# Patient Record
Sex: FEMALE | Race: BLACK OR AFRICAN AMERICAN | NOT HISPANIC OR LATINO | Employment: FULL TIME | URBAN - METROPOLITAN AREA
[De-identification: names, ages, dates, MRNs, and addresses within clinical notes are randomized per-mention and may not be internally consistent; named-entity substitution may affect disease eponyms.]

---

## 2018-02-13 ENCOUNTER — HOSPITAL ENCOUNTER (OUTPATIENT)
Dept: FAMILY MEDICINE CLINIC | Facility: CLINIC | Age: 58
Setting detail: SPECIMEN
Discharge: HOME | End: 2018-02-13
Attending: FAMILY MEDICINE | Admitting: FAMILY MEDICINE

## 2018-02-13 LAB
ALBUMIN SERPL-MCNC: 3.7 G/DL (ref 3.5–4.8)
ALBUMIN/GLOB SERPL: 1.1 {RATIO} (ref 1–1.7)
ALP SERPL-CCNC: 52 IU/L (ref 32–91)
ALT SERPL-CCNC: 14 IU/L (ref 14–54)
ANION GAP SERPL CALC-SCNC: 11.2 MMOL/L (ref 10–20)
AST SERPL-CCNC: 17 IU/L (ref 15–41)
BILIRUB SERPL-MCNC: 0.7 MG/DL (ref 0.3–1.2)
BUN SERPL-MCNC: 11 MG/DL (ref 8–20)
BUN/CREAT SERPL: 11 (ref 5.4–26.2)
CALCIUM SERPL-MCNC: 9.3 MG/DL (ref 8.9–10.3)
CHLORIDE SERPL-SCNC: 104 MMOL/L (ref 101–111)
CHOLEST SERPL-MCNC: 208 MG/DL
CHOLEST/HDLC SERPL: 3.1 {RATIO}
CONV CO2: 27 MMOL/L (ref 22–32)
CONV LDL CHOLESTEROL DIRECT: 122 MG/DL (ref 0–100)
CONV TOTAL PROTEIN: 7.1 G/DL (ref 6.1–7.9)
CREAT UR-MCNC: 1 MG/DL (ref 0.4–1)
GLOBULIN UR ELPH-MCNC: 3.4 G/DL (ref 2.5–3.8)
GLUCOSE SERPL-MCNC: 101 MG/DL (ref 65–99)
HDLC SERPL-MCNC: 68 MG/DL
LDLC/HDLC SERPL: 1.8 {RATIO}
LIPID INTERPRETATION: ABNORMAL
POTASSIUM SERPL-SCNC: 4.2 MMOL/L (ref 3.6–5.1)
SODIUM SERPL-SCNC: 138 MMOL/L (ref 136–144)
TRIGL SERPL-MCNC: 131 MG/DL
VLDLC SERPL CALC-MCNC: 17.7 MG/DL

## 2018-03-05 ENCOUNTER — HOSPITAL ENCOUNTER (OUTPATIENT)
Dept: MAMMOGRAPHY | Facility: HOSPITAL | Age: 58
Discharge: HOME | End: 2018-03-05
Attending: FAMILY MEDICINE | Admitting: FAMILY MEDICINE

## 2018-03-28 ENCOUNTER — HOSPITAL ENCOUNTER (OUTPATIENT)
Dept: GENERAL RADIOLOGY | Facility: HOSPITAL | Age: 58
Discharge: HOME | End: 2018-03-28

## 2018-04-17 ENCOUNTER — HOSPITAL ENCOUNTER (OUTPATIENT)
Dept: PHYSICAL THERAPY | Facility: HOSPITAL | Age: 58
Setting detail: RECURRING SERIES
Discharge: HOME | End: 2018-05-24

## 2019-03-04 ENCOUNTER — HOSPITAL ENCOUNTER (OUTPATIENT)
Dept: MAMMOGRAPHY | Facility: HOSPITAL | Age: 59
Discharge: HOME | End: 2019-03-04
Attending: FAMILY MEDICINE | Admitting: FAMILY MEDICINE

## 2019-07-02 ENCOUNTER — LAB (OUTPATIENT)
Dept: FAMILY MEDICINE CLINIC | Facility: CLINIC | Age: 59
End: 2019-07-02

## 2019-07-02 ENCOUNTER — OFFICE VISIT (OUTPATIENT)
Dept: FAMILY MEDICINE CLINIC | Facility: CLINIC | Age: 59
End: 2019-07-02

## 2019-07-02 VITALS
SYSTOLIC BLOOD PRESSURE: 126 MMHG | DIASTOLIC BLOOD PRESSURE: 77 MMHG | WEIGHT: 188 LBS | BODY MASS INDEX: 28.49 KG/M2 | OXYGEN SATURATION: 98 % | HEART RATE: 76 BPM | HEIGHT: 68 IN

## 2019-07-02 DIAGNOSIS — I10 ESSENTIAL HYPERTENSION: ICD-10-CM

## 2019-07-02 DIAGNOSIS — N95.1 HOT FLASHES DUE TO MENOPAUSE: ICD-10-CM

## 2019-07-02 DIAGNOSIS — I10 ESSENTIAL HYPERTENSION: Primary | ICD-10-CM

## 2019-07-02 DIAGNOSIS — Z12.11 ENCOUNTER FOR SCREENING COLONOSCOPY: ICD-10-CM

## 2019-07-02 DIAGNOSIS — J30.9 ALLERGIC RHINITIS, UNSPECIFIED SEASONALITY, UNSPECIFIED TRIGGER: ICD-10-CM

## 2019-07-02 LAB
ANION GAP SERPL CALCULATED.3IONS-SCNC: 12.4 MMOL/L (ref 10–20)
BUN BLD-MCNC: 9 MG/DL (ref 8–20)
BUN/CREAT SERPL: 9 (ref 5.4–26.2)
CALCIUM SPEC-SCNC: 9.1 MG/DL (ref 8.9–10.3)
CHLORIDE SERPL-SCNC: 102 MMOL/L (ref 101–111)
CO2 SERPL-SCNC: 25 MMOL/L (ref 22–32)
CREAT BLD-MCNC: 1 MG/DL (ref 0.4–1)
GFR SERPL CREATININE-BSD FRML MDRD: 69 ML/MIN/1.73
GLUCOSE BLD-MCNC: 81 MG/DL (ref 65–99)
POTASSIUM BLD-SCNC: 3.4 MMOL/L (ref 3.6–5.1)
SODIUM BLD-SCNC: 136 MMOL/L (ref 136–144)

## 2019-07-02 PROCEDURE — 80048 BASIC METABOLIC PNL TOTAL CA: CPT | Performed by: FAMILY MEDICINE

## 2019-07-02 PROCEDURE — 36415 COLL VENOUS BLD VENIPUNCTURE: CPT | Performed by: FAMILY MEDICINE

## 2019-07-02 PROCEDURE — 99213 OFFICE O/P EST LOW 20 MIN: CPT | Performed by: FAMILY MEDICINE

## 2019-07-02 RX ORDER — LISINOPRIL AND HYDROCHLOROTHIAZIDE 20; 12.5 MG/1; MG/1
1 TABLET ORAL DAILY
Qty: 90 TABLET | Refills: 3 | Status: SHIPPED | OUTPATIENT
Start: 2019-07-02 | End: 2020-06-26

## 2019-07-02 RX ORDER — CETIRIZINE HYDROCHLORIDE 10 MG/1
1 TABLET ORAL EVERY 24 HOURS
COMMUNITY
Start: 2017-07-13 | End: 2019-07-02 | Stop reason: SDUPTHER

## 2019-07-02 RX ORDER — ESTRADIOL 2 MG/1
1 TABLET ORAL DAILY
COMMUNITY
Start: 2019-05-14 | End: 2019-07-02 | Stop reason: SDUPTHER

## 2019-07-02 RX ORDER — FLUTICASONE PROPIONATE 50 MCG
SPRAY, SUSPENSION (ML) NASAL AS NEEDED
COMMUNITY
Start: 2016-11-15 | End: 2021-04-16 | Stop reason: SDUPTHER

## 2019-07-02 RX ORDER — ESTRADIOL 2 MG/1
2 TABLET ORAL DAILY
Qty: 90 TABLET | Refills: 3 | Status: SHIPPED | OUTPATIENT
Start: 2019-07-02 | End: 2020-07-19

## 2019-07-02 RX ORDER — LISINOPRIL AND HYDROCHLOROTHIAZIDE 20; 12.5 MG/1; MG/1
1 TABLET ORAL DAILY
COMMUNITY
Start: 2017-08-14 | End: 2019-07-02 | Stop reason: SDUPTHER

## 2019-07-02 RX ORDER — CETIRIZINE HYDROCHLORIDE 10 MG/1
10 TABLET ORAL EVERY 24 HOURS
Qty: 90 TABLET | Refills: 3 | Status: SHIPPED | OUTPATIENT
Start: 2019-07-02 | End: 2020-06-26

## 2019-07-02 RX ORDER — TRIAMCINOLONE ACETONIDE 5 MG/G
CREAM TOPICAL AS NEEDED
Refills: 0 | COMMUNITY
Start: 2019-04-23 | End: 2022-08-18

## 2019-07-02 NOTE — PROGRESS NOTES
Vito Walls is a 59 y.o. female.     Here for follow up on bp  Has lost 4.5 pounds  Needs refills on her meds  Has had hemorrhoids removed in past  Has a new one  She is due colonoscopy         The following portions of the patient's history were reviewed and updated as appropriate: allergies, current medications, past family history, past medical history, past social history, past surgical history and problem list.  Past Medical History:   Diagnosis Date   • Fibroids    • Hypertension      Past Surgical History:   Procedure Laterality Date   • HYSTERECTOMY      With BSO      Family History   Problem Relation Age of Onset   • Breast cancer Mother    • Osteoporosis Mother    • Diabetes Father    • Hypertension Father      Social History     Socioeconomic History   • Marital status: Single     Spouse name: Not on file   • Number of children: Not on file   • Years of education: Not on file   • Highest education level: Not on file   Tobacco Use   • Smoking status: Former Smoker   Substance and Sexual Activity   • Alcohol use: Yes   • Drug use: No         Current Outpatient Medications:   •  cetirizine (zyrTEC) 10 MG tablet, Take 1 tablet by mouth Daily., Disp: 90 tablet, Rfl: 3  •  fluticasone (FLONASE) 50 MCG/ACT nasal spray, As Needed., Disp: , Rfl:   •  lisinopril-hydrochlorothiazide (PRINZIDE,ZESTORETIC) 20-12.5 MG per tablet, Take 1 tablet by mouth Daily., Disp: 90 tablet, Rfl: 3  •  estradiol (ESTRACE) 2 MG tablet, Take 1 tablet by mouth Daily., Disp: 90 tablet, Rfl: 3  •  triamcinolone (KENALOG) 0.5 % cream, As Needed., Disp: , Rfl: 0    Review of Systems   Constitutional: Negative for diaphoresis, fatigue, fever, unexpected weight gain and unexpected weight loss.   Respiratory: Negative for cough, chest tightness and shortness of breath.    Cardiovascular: Negative for chest pain, palpitations and leg swelling.   Gastrointestinal: Negative for nausea and vomiting.        Hemorrhoid - no pain or  "bleeding   Neurological: Negative for dizziness, syncope and headache.     /77 (BP Location: Right arm, Patient Position: Sitting, Cuff Size: Adult)   Pulse 76   Ht 172.7 cm (68\")   Wt 85.3 kg (188 lb)   SpO2 98%   BMI 28.59 kg/m²       Objective   Physical Exam   Constitutional: Vital signs are normal. She appears well-developed and well-nourished. No distress. She appears overweight.   HENT:   Head: Normocephalic and atraumatic.   Neck: Neck supple. No JVD present. No thyromegaly present.   Cardiovascular: Normal rate, regular rhythm, normal heart sounds and intact distal pulses. Exam reveals no gallop and no friction rub.   No murmur heard.  Pulmonary/Chest: Effort normal and breath sounds normal. No respiratory distress. She has no wheezes. She has no rales.   Musculoskeletal: She exhibits no edema.   Lymphadenopathy:     She has no cervical adenopathy.   Neurological: She is alert.   Skin: Skin is warm and dry.   Psychiatric: She has a normal mood and affect.   Nursing note and vitals reviewed.        Assessment/Plan   Problems Addressed this Visit        Cardiovascular and Mediastinum    Hypertension - Primary    Relevant Medications    lisinopril-hydrochlorothiazide (PRINZIDE,ZESTORETIC) 20-12.5 MG per tablet    Other Relevant Orders    Basic metabolic panel      Other Visit Diagnoses     Allergic rhinitis, unspecified seasonality, unspecified trigger        Relevant Medications    cetirizine (zyrTEC) 10 MG tablet    Hot flashes due to menopause        Relevant Medications    estradiol (ESTRACE) 2 MG tablet    Encounter for screening colonoscopy        Relevant Orders    Ambulatory Referral For Screening Colonoscopy                 "

## 2019-09-04 ENCOUNTER — OFFICE (OUTPATIENT)
Dept: URBAN - METROPOLITAN AREA CLINIC 64 | Facility: CLINIC | Age: 59
End: 2019-09-04
Payer: COMMERCIAL

## 2019-09-04 VITALS
WEIGHT: 197 LBS | SYSTOLIC BLOOD PRESSURE: 153 MMHG | HEART RATE: 63 BPM | DIASTOLIC BLOOD PRESSURE: 93 MMHG | HEIGHT: 69 IN

## 2019-09-04 DIAGNOSIS — K59.00 CONSTIPATION, UNSPECIFIED: ICD-10-CM

## 2019-09-04 DIAGNOSIS — K64.4 RESIDUAL HEMORRHOIDAL SKIN TAGS: ICD-10-CM

## 2019-09-04 DIAGNOSIS — Z12.11 ENCOUNTER FOR SCREENING FOR MALIGNANT NEOPLASM OF COLON: ICD-10-CM

## 2019-09-04 PROCEDURE — 99203 OFFICE O/P NEW LOW 30 MIN: CPT | Performed by: INTERNAL MEDICINE

## 2020-01-07 ENCOUNTER — OFFICE VISIT (OUTPATIENT)
Dept: FAMILY MEDICINE CLINIC | Facility: CLINIC | Age: 60
End: 2020-01-07

## 2020-01-07 VITALS
TEMPERATURE: 98.2 F | HEART RATE: 75 BPM | OXYGEN SATURATION: 99 % | DIASTOLIC BLOOD PRESSURE: 87 MMHG | WEIGHT: 191 LBS | BODY MASS INDEX: 29.04 KG/M2 | SYSTOLIC BLOOD PRESSURE: 146 MMHG

## 2020-01-07 DIAGNOSIS — J06.9 ACUTE URI: Primary | ICD-10-CM

## 2020-01-07 PROCEDURE — 99213 OFFICE O/P EST LOW 20 MIN: CPT | Performed by: NURSE PRACTITIONER

## 2020-01-07 RX ORDER — AMOXICILLIN 875 MG/1
875 TABLET, COATED ORAL 2 TIMES DAILY
Qty: 20 TABLET | Refills: 0 | Status: SHIPPED | OUTPATIENT
Start: 2020-01-07 | End: 2020-01-17

## 2020-01-07 NOTE — PROGRESS NOTES
Subjective   Yaa Walls is a 59 y.o. female.       HPI   Pt. Is here today with a possible URI.  Symptoms started several days ago.  She has had head congestion; ear pressure; sore throat; cough.  No fevers.    Has taken otc cold med with little relief.  Boyfriend has similar symptoms.      The following portions of the patient's history were reviewed and updated as appropriate: allergies, current medications, past family history, past medical history, past social history, past surgical history and problem list.    Review of Systems   Constitutional: Negative for activity change, appetite change, chills, diaphoresis, fatigue, fever, unexpected weight gain and unexpected weight loss.   HENT: Positive for congestion, ear pain, postnasal drip, sinus pressure, sneezing and sore throat. Negative for swollen glands and trouble swallowing.    Eyes: Negative for pain, discharge, redness and itching.   Respiratory: Positive for cough. Negative for shortness of breath and wheezing.    Cardiovascular: Negative for chest pain, palpitations and leg swelling.   Gastrointestinal: Negative for diarrhea, nausea and vomiting.   Neurological: Positive for dizziness. Negative for headache.   Hematological: Negative for adenopathy.   Psychiatric/Behavioral: Negative for negative for hyperactivity and depressed mood.       Objective   Physical Exam   Constitutional: She is oriented to person, place, and time. She appears well-developed and well-nourished. No distress.   HENT:   Head: Normocephalic and atraumatic.   Right Ear: Hearing, external ear and ear canal normal. Tympanic membrane is erythematous.   Left Ear: Hearing, external ear and ear canal normal. Tympanic membrane is erythematous.   Nose: Rhinorrhea present. Right sinus exhibits frontal sinus tenderness. Right sinus exhibits no maxillary sinus tenderness. Left sinus exhibits frontal sinus tenderness. Left sinus exhibits no maxillary sinus tenderness.   Mouth/Throat: Uvula  is midline and mucous membranes are normal. Posterior oropharyngeal erythema present. No oropharyngeal exudate or posterior oropharyngeal edema.   Eyes: Pupils are equal, round, and reactive to light. Conjunctivae and EOM are normal. Right eye exhibits no discharge. Left eye exhibits no discharge.   Neck: Normal range of motion. Neck supple.   Cardiovascular: Normal rate, regular rhythm, normal heart sounds and intact distal pulses.   No murmur heard.  Pulmonary/Chest: Effort normal and breath sounds normal. No respiratory distress.   Abdominal: Soft. Bowel sounds are normal. There is no tenderness.   Lymphadenopathy:     She has no cervical adenopathy.   Neurological: She is alert and oriented to person, place, and time.   Skin: Skin is warm and dry. Capillary refill takes less than 2 seconds. No rash noted. No erythema.   Psychiatric: She has a normal mood and affect.   Vitals reviewed.        Assessment/Plan   Yaa was seen today for cough, dizziness and nasal congestion.    Diagnoses and all orders for this visit:    Acute URI  Comments:  Given Amoxicillin and Stahist PRN.    Increase fluids and rest.    Call for worsening.   Orders:  -     amoxicillin (AMOXIL) 875 MG tablet; Take 1 tablet by mouth 2 (Two) Times a Day for 10 days.  -     Chlorcyclizine-Pseudoephed (STAHIST AD) 25-60 MG tablet; Take 1 tablet by mouth 2 (Two) Times a Day As Needed (sinus pressure / congestion).

## 2020-03-18 ENCOUNTER — OFFICE VISIT (OUTPATIENT)
Dept: FAMILY MEDICINE CLINIC | Facility: CLINIC | Age: 60
End: 2020-03-18

## 2020-03-18 VITALS
SYSTOLIC BLOOD PRESSURE: 155 MMHG | HEIGHT: 68 IN | WEIGHT: 199 LBS | DIASTOLIC BLOOD PRESSURE: 85 MMHG | BODY MASS INDEX: 30.16 KG/M2 | TEMPERATURE: 98.1 F | OXYGEN SATURATION: 98 % | HEART RATE: 73 BPM

## 2020-03-18 DIAGNOSIS — J06.9 ACUTE URI: Primary | ICD-10-CM

## 2020-03-18 DIAGNOSIS — J06.9 UPPER RESPIRATORY TRACT INFECTION, UNSPECIFIED TYPE: ICD-10-CM

## 2020-03-18 PROCEDURE — 99213 OFFICE O/P EST LOW 20 MIN: CPT | Performed by: NURSE PRACTITIONER

## 2020-03-18 RX ORDER — AMOXICILLIN 875 MG/1
875 TABLET, COATED ORAL 2 TIMES DAILY
Qty: 20 TABLET | Refills: 0 | Status: SHIPPED | OUTPATIENT
Start: 2020-03-18 | End: 2020-03-28

## 2020-03-18 NOTE — PROGRESS NOTES
Subjective   Yaa Walls is a 59 y.o. female.       HPI   Pt. Is here today with c/o left ear pain.  Symptoms started almost a week ago.  She reports left ear pain; pain in her left jaw and upper teeth; mild head congestion; drainage was green; mild cough.  No fevers.  No N/V/D.    Uses Flonase as needed but takes Zyrtec daily.      The following portions of the patient's history were reviewed and updated as appropriate: allergies, current medications, past family history, past medical history, past social history, past surgical history and problem list.    Review of Systems   Constitutional: Negative for activity change, appetite change, chills, diaphoresis, fatigue, fever, unexpected weight gain and unexpected weight loss.   HENT: Positive for congestion and ear pain. Negative for ear discharge.    Eyes: Negative for pain, discharge, redness and itching.   Respiratory: Positive for cough. Negative for chest tightness, shortness of breath and wheezing.    Cardiovascular: Negative for chest pain, palpitations and leg swelling.   Gastrointestinal: Negative for diarrhea, nausea, vomiting and indigestion.   Musculoskeletal: Negative for arthralgias and myalgias.   Skin: Negative for rash and skin lesions.   Neurological: Negative for dizziness, light-headedness, headache and confusion.   Psychiatric/Behavioral: Negative for depressed mood. The patient is not nervous/anxious.        Objective   Physical Exam   Constitutional: She is oriented to person, place, and time. She appears well-developed and well-nourished. No distress.   HENT:   Head: Normocephalic and atraumatic.   Right Ear: Hearing, tympanic membrane, external ear and ear canal normal.   Left Ear: Hearing, external ear and ear canal normal. Tympanic membrane is erythematous.   Nose: Rhinorrhea present. Right sinus exhibits no maxillary sinus tenderness and no frontal sinus tenderness. Left sinus exhibits maxillary sinus tenderness. Left sinus exhibits no  frontal sinus tenderness.   Mouth/Throat: Uvula is midline, oropharynx is clear and moist and mucous membranes are normal.   Eyes: Pupils are equal, round, and reactive to light. Conjunctivae are normal. Right eye exhibits no discharge. Left eye exhibits no discharge.   Neck: Normal range of motion. Neck supple. No thyromegaly present.   Cardiovascular: Normal rate, regular rhythm, normal heart sounds and intact distal pulses.   No murmur heard.  Pulmonary/Chest: Effort normal and breath sounds normal. No respiratory distress. She has no wheezes. She exhibits no tenderness.   Abdominal: Soft. Bowel sounds are normal. She exhibits no distension. There is no tenderness.   Musculoskeletal: She exhibits no edema.   Lymphadenopathy:     She has no cervical adenopathy.   Neurological: She is alert and oriented to person, place, and time.   Skin: Skin is warm and dry. Capillary refill takes less than 2 seconds. No erythema.   Psychiatric: She has a normal mood and affect.   Vitals reviewed.        Assessment/Plan   Yaa was seen today for earache.    Diagnoses and all orders for this visit:    Acute URI  Comments:  Given Amoxicillin and Stahist PRN.    Increase fluids and rest.    Call for worsening.   Orders:  -     Chlorcyclizine-Pseudoephed (Stahist AD) 25-60 MG tablet; Take 1 tablet by mouth 2 (Two) Times a Day As Needed (sinus pressure / congestion).    Upper respiratory tract infection, unspecified type  -     amoxicillin (AMOXIL) 875 MG tablet; Take 1 tablet by mouth 2 (Two) Times a Day for 10 days.

## 2020-06-26 DIAGNOSIS — I10 ESSENTIAL HYPERTENSION: ICD-10-CM

## 2020-06-26 DIAGNOSIS — J30.9 ALLERGIC RHINITIS, UNSPECIFIED SEASONALITY, UNSPECIFIED TRIGGER: ICD-10-CM

## 2020-06-26 RX ORDER — CETIRIZINE HYDROCHLORIDE 10 MG/1
TABLET ORAL
Qty: 90 TABLET | Refills: 2 | Status: SHIPPED | OUTPATIENT
Start: 2020-06-26 | End: 2021-03-23

## 2020-06-26 RX ORDER — LISINOPRIL AND HYDROCHLOROTHIAZIDE 20; 12.5 MG/1; MG/1
TABLET ORAL
Qty: 90 TABLET | Refills: 2 | Status: SHIPPED | OUTPATIENT
Start: 2020-06-26 | End: 2021-03-23

## 2020-07-19 DIAGNOSIS — N95.1 HOT FLASHES DUE TO MENOPAUSE: ICD-10-CM

## 2020-07-19 DIAGNOSIS — Z12.31 BREAST CANCER SCREENING BY MAMMOGRAM: Primary | ICD-10-CM

## 2020-07-19 RX ORDER — ESTRADIOL 2 MG/1
TABLET ORAL
Qty: 90 TABLET | Refills: 0 | Status: SHIPPED | OUTPATIENT
Start: 2020-07-19 | End: 2020-10-18

## 2020-08-03 ENCOUNTER — HOSPITAL ENCOUNTER (OUTPATIENT)
Dept: MAMMOGRAPHY | Facility: HOSPITAL | Age: 60
Discharge: HOME | End: 2020-08-03
Admitting: FAMILY MEDICINE

## 2020-08-03 DIAGNOSIS — Z12.31 BREAST CANCER SCREENING BY MAMMOGRAM: ICD-10-CM

## 2020-08-03 PROCEDURE — 77067 SCR MAMMO BI INCL CAD: CPT

## 2020-08-03 PROCEDURE — 77063 BREAST TOMOSYNTHESIS BI: CPT

## 2020-10-17 DIAGNOSIS — N95.1 HOT FLASHES DUE TO MENOPAUSE: ICD-10-CM

## 2020-10-18 RX ORDER — ESTRADIOL 2 MG/1
TABLET ORAL
Qty: 90 TABLET | Refills: 3 | Status: SHIPPED | OUTPATIENT
Start: 2020-10-18 | End: 2021-10-12

## 2021-03-23 DIAGNOSIS — I10 ESSENTIAL HYPERTENSION: ICD-10-CM

## 2021-03-23 DIAGNOSIS — J30.9 ALLERGIC RHINITIS, UNSPECIFIED SEASONALITY, UNSPECIFIED TRIGGER: ICD-10-CM

## 2021-03-23 RX ORDER — CETIRIZINE HYDROCHLORIDE 10 MG/1
TABLET ORAL
Qty: 90 TABLET | Refills: 0 | Status: SHIPPED | OUTPATIENT
Start: 2021-03-23 | End: 2021-05-31

## 2021-03-23 RX ORDER — LISINOPRIL AND HYDROCHLOROTHIAZIDE 20; 12.5 MG/1; MG/1
TABLET ORAL
Qty: 90 TABLET | Refills: 0 | Status: SHIPPED | OUTPATIENT
Start: 2021-03-23 | End: 2021-05-31

## 2021-03-23 NOTE — TELEPHONE ENCOUNTER
I refilled her prescriptions but it is been a year since she was last seen.  She needs to make an appointment and be seen in the office

## 2021-04-16 ENCOUNTER — OFFICE VISIT (OUTPATIENT)
Dept: FAMILY MEDICINE CLINIC | Facility: CLINIC | Age: 61
End: 2021-04-16

## 2021-04-16 VITALS
OXYGEN SATURATION: 100 % | TEMPERATURE: 97.7 F | WEIGHT: 175 LBS | BODY MASS INDEX: 26.61 KG/M2 | HEART RATE: 84 BPM | DIASTOLIC BLOOD PRESSURE: 84 MMHG | SYSTOLIC BLOOD PRESSURE: 149 MMHG

## 2021-04-16 DIAGNOSIS — H92.02 LEFT EAR PAIN: Primary | ICD-10-CM

## 2021-04-16 PROCEDURE — 99213 OFFICE O/P EST LOW 20 MIN: CPT | Performed by: NURSE PRACTITIONER

## 2021-04-16 RX ORDER — FLUTICASONE PROPIONATE 50 MCG
2 SPRAY, SUSPENSION (ML) NASAL DAILY
Qty: 9.9 ML | Refills: 1 | Status: SHIPPED | OUTPATIENT
Start: 2021-04-16

## 2021-04-16 NOTE — PROGRESS NOTES
Vito Walls is a 60 y.o. female.     Pt is here today with c/o left ear pain.  Symptoms started 3 days ago.  She has a history of ear infections.  She states that the pain radiates down into her throat.  She has been using a neti pot.  She does have allergies.  Denies congestion or sinus pressure.       The following portions of the patient's history were reviewed and updated as appropriate: allergies, current medications, past family history, past medical history, past social history, past surgical history and problem list.    Review of Systems   Constitutional: Negative for chills, fatigue and fever.   HENT: Positive for ear pain. Negative for congestion, sinus pressure and sore throat.    Respiratory: Negative for chest tightness and shortness of breath.    Cardiovascular: Negative for chest pain and palpitations.   Neurological: Negative for dizziness and headache.       Objective   /84 (BP Location: Left arm, Patient Position: Sitting, Cuff Size: Adult)   Pulse 84   Temp 97.7 °F (36.5 °C) (Tympanic)   Wt 79.4 kg (175 lb)   SpO2 100%   BMI 26.61 kg/m²   Physical Exam  Constitutional:       Appearance: Normal appearance. She is not ill-appearing.   HENT:      Head: Normocephalic and atraumatic.      Right Ear: Tympanic membrane and ear canal normal.      Left Ear: Tympanic membrane and ear canal normal.      Mouth/Throat:      Pharynx: No oropharyngeal exudate or posterior oropharyngeal erythema.   Pulmonary:      Effort: Pulmonary effort is normal. No respiratory distress.   Neurological:      General: No focal deficit present.      Mental Status: She is alert and oriented to person, place, and time.   Psychiatric:         Mood and Affect: Mood normal.         Behavior: Behavior normal.         Thought Content: Thought content normal.         Judgment: Judgment normal.           Assessment/Plan     Diagnoses and all orders for this visit:    1. Left ear pain (Primary)  Comments:  exam  normal  possibly allergies  start flonase  ibuprofen for pain  call if no improvement    Other orders  -     fluticasone (FLONASE) 50 MCG/ACT nasal spray; 2 sprays into the nostril(s) as directed by provider Daily.  Dispense: 9.9 mL; Refill: 1

## 2021-05-31 DIAGNOSIS — J30.9 ALLERGIC RHINITIS, UNSPECIFIED SEASONALITY, UNSPECIFIED TRIGGER: ICD-10-CM

## 2021-05-31 DIAGNOSIS — I10 ESSENTIAL HYPERTENSION: ICD-10-CM

## 2021-05-31 RX ORDER — CETIRIZINE HYDROCHLORIDE 10 MG/1
TABLET ORAL
Qty: 90 TABLET | Refills: 3 | Status: SHIPPED | OUTPATIENT
Start: 2021-05-31 | End: 2022-07-11

## 2021-05-31 RX ORDER — LISINOPRIL AND HYDROCHLOROTHIAZIDE 20; 12.5 MG/1; MG/1
TABLET ORAL
Qty: 90 TABLET | Refills: 3 | Status: SHIPPED | OUTPATIENT
Start: 2021-05-31 | End: 2021-08-13

## 2021-08-13 ENCOUNTER — OFFICE VISIT (OUTPATIENT)
Dept: FAMILY MEDICINE CLINIC | Facility: CLINIC | Age: 61
End: 2021-08-13

## 2021-08-13 VITALS
OXYGEN SATURATION: 97 % | HEIGHT: 68 IN | HEART RATE: 71 BPM | WEIGHT: 176 LBS | BODY MASS INDEX: 26.67 KG/M2 | TEMPERATURE: 97.7 F | SYSTOLIC BLOOD PRESSURE: 168 MMHG | DIASTOLIC BLOOD PRESSURE: 84 MMHG

## 2021-08-13 DIAGNOSIS — Z00.00 PREVENTATIVE HEALTH CARE: Primary | ICD-10-CM

## 2021-08-13 DIAGNOSIS — Z12.11 SCREENING FOR COLON CANCER: ICD-10-CM

## 2021-08-13 DIAGNOSIS — I10 ESSENTIAL HYPERTENSION: ICD-10-CM

## 2021-08-13 DIAGNOSIS — Z11.59 NEED FOR HEPATITIS C SCREENING TEST: ICD-10-CM

## 2021-08-13 DIAGNOSIS — Z12.31 BREAST CANCER SCREENING BY MAMMOGRAM: ICD-10-CM

## 2021-08-13 LAB
BILIRUB BLD-MCNC: NEGATIVE MG/DL
CLARITY, POC: CLEAR
COLOR UR: YELLOW
GLUCOSE UR STRIP-MCNC: NEGATIVE MG/DL
KETONES UR QL: NEGATIVE
LEUKOCYTE EST, POC: NEGATIVE
NITRITE UR-MCNC: NEGATIVE MG/ML
PH UR: 6.5 [PH] (ref 5–8)
PROT UR STRIP-MCNC: NEGATIVE MG/DL
RBC # UR STRIP: NEGATIVE /UL
SP GR UR: 1.01 (ref 1–1.03)
UROBILINOGEN UR QL: NORMAL

## 2021-08-13 PROCEDURE — 99396 PREV VISIT EST AGE 40-64: CPT | Performed by: FAMILY MEDICINE

## 2021-08-13 PROCEDURE — 81003 URINALYSIS AUTO W/O SCOPE: CPT | Performed by: FAMILY MEDICINE

## 2021-08-13 RX ORDER — LISINOPRIL AND HYDROCHLOROTHIAZIDE 20; 12.5 MG/1; MG/1
2 TABLET ORAL DAILY
Start: 2021-08-13 | End: 2022-01-25 | Stop reason: SDUPTHER

## 2021-08-13 NOTE — PROGRESS NOTES
Subjective   Yaa Walls is a 61 y.o. female.     Here for CPE  She has had a hysterectomy in the past  Last colonoscopy was 10 years ago  Pain in both wrist - sharp - volar aspect- months to a year  occ tingling  She does lots of typing  Asking about wrist splints  Had covid shots  freq constipation - hard stools         The following portions of the patient's history were reviewed and updated as appropriate: allergies, current medications, past family history, past medical history, past social history, past surgical history, and problem list.  Past Medical History:   Diagnosis Date   • Allergic    • Fibroids    • Hypertension      Past Surgical History:   Procedure Laterality Date   • COLONOSCOPY  Sept    Could I use the [place]   • HYSTERECTOMY      With BSO    • OOPHORECTOMY       Family History   Problem Relation Age of Onset   • Osteoporosis Mother    • Arthritis Mother            • COPD Mother    • Diabetes Father    • Hypertension Father      Social History     Socioeconomic History   • Marital status: Single     Spouse name: Not on file   • Number of children: Not on file   • Years of education: Not on file   • Highest education level: Not on file   Tobacco Use   • Smoking status: Former Smoker     Packs/day: 0.00     Years: 0.00     Pack years: 0.00     Types: Cigarettes   • Smokeless tobacco: Never Used   Vaping Use   • Vaping Use: Never used   Substance and Sexual Activity   • Alcohol use: Yes     Alcohol/week: 2.0 standard drinks     Types: 2 Glasses of wine per week     Comment: social   • Drug use: No   • Sexual activity: Yes     Partners: Male     Birth control/protection: None         Current Outpatient Medications:   •  cetirizine (zyrTEC) 10 MG tablet, TAKE 1 TABLET DAILY, Disp: 90 tablet, Rfl: 3  •  Chlorcyclizine-Pseudoephed (Stahist AD) 25-60 MG tablet, Take 1 tablet by mouth 2 (Two) Times a Day As Needed (sinus pressure / congestion)., Disp: 30 tablet, Rfl: 0  •  estradiol  "(ESTRACE) 2 MG tablet, TAKE 1 TABLET DAILY, Disp: 90 tablet, Rfl: 3  •  fluticasone (FLONASE) 50 MCG/ACT nasal spray, 2 sprays into the nostril(s) as directed by provider Daily., Disp: 9.9 mL, Rfl: 1  •  lisinopril-hydrochlorothiazide (PRINZIDE,ZESTORETIC) 20-12.5 MG per tablet, Take 2 tablets by mouth Daily., Disp: , Rfl:   •  triamcinolone (KENALOG) 0.5 % cream, As Needed., Disp: , Rfl: 0    Review of Systems   Constitutional: Negative.    HENT: Negative.    Eyes: Negative.    Respiratory: Negative.    Cardiovascular: Negative.    Gastrointestinal: Positive for constipation. Negative for anal bleeding, blood in stool, nausea and vomiting.   Genitourinary: Negative.    Musculoskeletal: Positive for arthralgias.   Skin: Negative.    Neurological: Negative for dizziness, syncope, light-headedness and headache.   Hematological: Negative.    Psychiatric/Behavioral: Negative.      /84 (BP Location: Left arm, Patient Position: Sitting, Cuff Size: Adult)   Pulse 71   Temp 97.7 °F (36.5 °C) (Temporal)   Ht 172.7 cm (68\")   Wt 79.8 kg (176 lb)   SpO2 97%   Breastfeeding No   BMI 26.76 kg/m²       Objective   Physical Exam  Vitals and nursing note reviewed. Exam conducted with a chaperone present.   Constitutional:       Appearance: Normal appearance. She is well-developed and well-groomed.   HENT:      Head: Normocephalic and atraumatic.      Right Ear: Tympanic membrane, ear canal and external ear normal.      Left Ear: Tympanic membrane, ear canal and external ear normal.      Nose: Nose normal.      Mouth/Throat:      Mouth: Mucous membranes are moist.      Pharynx: Oropharynx is clear.   Eyes:      Extraocular Movements: Extraocular movements intact.      Conjunctiva/sclera: Conjunctivae normal.      Pupils: Pupils are equal, round, and reactive to light.   Neck:      Thyroid: No thyromegaly.      Vascular: No carotid bruit.   Cardiovascular:      Rate and Rhythm: Normal rate and regular rhythm.      " Pulses: Normal pulses.      Heart sounds: Normal heart sounds.   Pulmonary:      Effort: Pulmonary effort is normal.      Breath sounds: Normal breath sounds.   Chest:      Breasts:         Right: Normal.         Left: Normal.   Abdominal:      General: Abdomen is flat. Bowel sounds are normal.      Palpations: Abdomen is soft. There is no hepatomegaly, splenomegaly or mass.      Tenderness: There is no abdominal tenderness.      Hernia: No hernia is present.   Musculoskeletal:      Right wrist: Normal.      Left wrist: Normal.      Cervical back: Normal range of motion and neck supple.      Right lower leg: No edema.      Left lower leg: No edema.   Lymphadenopathy:      Cervical: No cervical adenopathy.      Upper Body:      Right upper body: No supraclavicular or axillary adenopathy.      Left upper body: No supraclavicular or axillary adenopathy.   Skin:     General: Skin is warm and dry.      Findings: No lesion or rash.   Neurological:      General: No focal deficit present.      Mental Status: She is alert.      Motor: Motor function is intact.      Deep Tendon Reflexes: Reflexes are normal and symmetric.      Comments: Negative Tinel's and Phalen's   Psychiatric:         Attention and Perception: Attention normal.         Mood and Affect: Mood normal.         Behavior: Behavior is cooperative.       Brief Urine Lab Results  (Last result in the past 365 days)      Color   Clarity   Blood   Leuk Est   Nitrite   Protein   CREAT   Urine HCG        08/13/21 1252 Yellow Clear Negative Negative Negative Negative                 Assessment/Plan   Problems Addressed this Visit        Cardiac and Vasculature    Hypertension    Relevant Medications    lisinopril-hydrochlorothiazide (PRINZIDE,ZESTORETIC) 20-12.5 MG per tablet      Other Visit Diagnoses     Preventative health care    -  Primary    Relevant Orders    POCT urinalysis dipstick, automated (Completed)    Comprehensive Metabolic Panel    Lipid Panel    Breast  cancer screening by mammogram        Relevant Orders    Mammo Screening Digital Tomosynthesis Bilateral With CAD    Screening for colon cancer        Relevant Orders    Cologuard - Stool, Per Rectum    Need for hepatitis C screening test        Relevant Orders    Hepatitis C Antibody      Diagnoses       Codes Comments    [facility]    -  Primary ICD-10-CM: Z00.00  ICD-9-CM: V70.0     Breast cancer screening by mammogram     ICD-10-CM: Z12.31  ICD-9-CM: V76.12     Screening for colon cancer     ICD-10-CM: Z12.11  ICD-9-CM: V76.51     Need for hepatitis C screening test     ICD-10-CM: Z11.59  ICD-9-CM: V73.89     Essential hypertension     ICD-10-CM: I10  ICD-9-CM: 401.9 encouraged continued weight loss        Overall she is doing fairly well  Mammogram was ordered  Cologuard was ordered (her previous colonoscopy was normal and there is no family history of colon cancer and she is not having any symptoms)  Labs were ordered including hepatitis C  She was encouraged get a flu shot this fall  The dose of her blood pressure medication was increased from 20/12.5 of lisinopril hydrochlorothiazide to 40 mg / 25 mg of lisinopril hydrochlorothiazide  I will see her back in a few months

## 2021-10-04 ENCOUNTER — HOSPITAL ENCOUNTER (OUTPATIENT)
Dept: MAMMOGRAPHY | Facility: HOSPITAL | Age: 61
Discharge: HOME | End: 2021-10-04
Admitting: FAMILY MEDICINE

## 2021-10-04 DIAGNOSIS — Z12.31 BREAST CANCER SCREENING BY MAMMOGRAM: ICD-10-CM

## 2021-10-04 PROCEDURE — 77063 BREAST TOMOSYNTHESIS BI: CPT

## 2021-10-04 PROCEDURE — 77067 SCR MAMMO BI INCL CAD: CPT

## 2021-10-12 DIAGNOSIS — N95.1 HOT FLASHES DUE TO MENOPAUSE: ICD-10-CM

## 2021-10-12 RX ORDER — ESTRADIOL 2 MG/1
TABLET ORAL
Qty: 90 TABLET | Refills: 3 | Status: SHIPPED | OUTPATIENT
Start: 2021-10-12 | End: 2022-10-10

## 2022-01-17 ENCOUNTER — E-VISIT (OUTPATIENT)
Dept: FAMILY MEDICINE CLINIC | Facility: TELEHEALTH | Age: 62
End: 2022-01-17

## 2022-01-17 NOTE — E-VISIT ESCALATED
Patient escalated   Provider Sarina Moore chose to escalate patient to another level of care because: Needs followup for possible COVID-19   Patient was sent the following message:   Please schedule a video visit so a COVID-19 test can be ordered for you.   What to do now:    Please set up a video visit  .   You won't be charged for your eVisit. If you paid with a credit card, the charge will be reversed.   Chief Complaint: Coronavirus (COVID-19), cold, sinus pain, allergy, or flu   Patient introduction   Patient is 61-year-old female who reports cough, fever (which may have resolved; see below), congestion, rhinitis, itchy or watery eyes, itchy nose or sneezing, new loss of smell or taste, sore throat, voice hoarseness, headache, sweats, chills, myalgia, and fatigue that started 3-5 days ago.   Patient has not requested COVID testing.   Coronavirus Disease 2019 (COVID-19) exposure, testing history, vaccination status, and vaccine injection site symptoms:    No known exposure to a confirmed or suspected case of COVID-19.    No recent travel outside of their local community.    Patient had a viral lab test > 3 months ago. Test result was negative.    Reports receiving 2 doses of the COVID-19 vaccine.    Received the Moderna vaccine for the first dose.    Received the Moderna vaccine for the second dose.    Received their most recent dose of the vaccine > 14 days ago.   Warning. The following may warrant further investigation:    Hypertension    Dizziness that makes it hard to stand, walk, or do daily activities   When asked why they're seeking care online today, patient reports they want a specific treatment or medication, want to know if they have a cold or something more serious, want to know if they need to be seen by a provider, and just want to feel better.   Patient requests a 7-day excuse note.   General presentation   Symptoms came on gradually.   Sinus and nasal symptoms:    Reports rhinitis.    Reports  itchy nose or sneezing.    Reports clear nasal drainage.    Nasal drainage is thick.    Reports postnasal drip.    Reports congestion with sinus pain or pressure on or around their forehead and eyes.    Patient first noticed sinus pain < 5 days ago.    Sinus pain is worse with Valsalva.    Denies history of unhealed nasal septal ulcer/nasal wound.    Denies antibiotic treatment for sinus infection in the last year.    Denies history of deviated septum or nasal polyps.   Sore throat:    Reports sore throat.    Denies recent strep exposure.    Patient does not think they have strep.    Patient is able to swallow liquids and solid foods with ease.    Reports mild hoarseness. Patient doesn't believe hoarseness is due to voice strain.   Head and body aches:    Reports headache, described as mild (1-3 on a scale of 1-10).    Reports sweats.    Reports chills.    Reports myalgia.    Reports fatigue.   Dizziness:    Reports dizziness that interferes with daily activities.   Cough:    Reports cough.    Cough is worse in the morning, during the day, and at night/while sleeping.    Cough is productive of sputum.    Describes color of mucus as clear.   Wheezing and SOB:    Denies COPD diagnosis.    Denies asthma diagnosis.    Denies wheezing.    Denies shortness of breath.    Denies previous albuterol inhaler use during URIs, bronchitis, or pneumonia.    Denies previous steroid inhaler use during URIs, bronchitis, or pneumonia.   Chest pain:    Reports chest pain, but only when coughing.    [place] Heart Score (MHS): 0, low risk of CAD. Assigning 1 point to each of 5 criteria (female >= 65 years old or male >= 55 years, known CAD, pain worse with exercise, pain not reproducible with palpation, and patient assumes pain is cardiac), the MHS is a validated clinical decision rule used to rule out coronary artery disease in primary care patients with chest pain.   Allergies:    Reports history of allergies.    Patient does not think  symptoms are allergy-related.    Patient has known seasonal allergies.   Flu exposure:    Reports recent exposure to confirmed household flu diagnosis.    Denies receiving a flu vaccine this season.   Patient denies the following red flags:    Changes in alertness or awareness    Symptoms suggesting airway obstruction    Symptoms suggesting intracranial hemorrhage    Decreased urination   Pregnancy/menstrual status/breastfeeding:    Patient is postmenopausal   Self-exam:    No difficulty moving their chin toward their chest    No palatal petechiae    Neck lymph nodes feel normal    No periorbital edema    Denies antibiotic treatment for similar symptoms within the past month   Current medications   Reports taking over-the-counter medication for current symptoms. Patient has taken cetirizine and fluticasone.   Reports taking hydrochlorothiazide / lisinopril Pill.   Medication allergies   None.   Medication contraindication review   Reports history positive for hypertension. Therefore, the following medication(s) will not be prescribed:    Metoclopramide    Acetaminophen-diphenhydramine-phenylephrine    Aspirin-chlorpheniramine-phenylephrine   Denies history of metoclopramide-associated dystonic reaction and tardive dyskinesia.   No known history of amoxicillin-clavulanate-associated cholestatic jaundice or hepatic impairment.   No known history of azithromycin-associated cholestatic jaundice or hepatic impairment.   Past medical history   Immune conditions: Denies immunocompromising conditions. Denies history of cancer.   Social history   Former smoker.   Assessment:   Patient determined to need a level of care not appropriate to be delivered through eVisit.   Plan:   Patient informed of need to seek in-person care      ----------   Electronically signed by ALFREDO Stewart on 2022-01-17 at 10:34AM   ----------   Patient Interview Transcript:   Why are you getting care through eVisit today? We can't guarantee a  specific treatment or test. Your provider will decide what's best for you. Select all that apply.    I want a specific treatment or medication    I want to know if I have a cold or something more serious    I want to know if I need to be seen by a provider    I just want to feel better!   Not selected:    I need a doctor's note    I want to be tested for COVID-19    I want to get the COVID-19 vaccine    I think I'm having side effects from the COVID-19 vaccine    None of the above   Tell us which specific treatment or medication you'd like. Your provider will make the final decision on which treatment is best for your condition.   The patient did not enter any additional information.   Which of these symptoms are bothering you? Select all that apply.    Cough    Fever    Stuffed-up nose or sinuses    Runny nose    Itchy or watery eyes    Itchy nose or sneezing    Loss of smell or taste    Sore throat    Hoarse voice or loss of voice    Headache    Sweats    Chills    Muscle or body aches    Fatigue or tiredness   Not selected:    Shortness of breath    Nausea or vomiting    Diarrhea    I don't have any of these symptoms   Before we learn more about why you're here, we'll get some information related to COVID-19. We'll ask about risk factors, testing, vaccination status, vaccine injection site symptoms, and exposure. Do you have any of these conditions? If so, you may be at increased risk for complications from COVID-19. Select all that apply.    None of the above   Not selected:    Chronic lung disease, such as cystic fibrosis or interstitial fibrosis    Heart disease, such as congenital heart disease, congestive heart failure, or coronary artery disease    Disorder of the brain, spinal cord, or nerves and muscles, such as dementia, cerebral palsy, epilepsy, muscular dystrophy, or developmental delay    Metabolic disorder or mitochondrial disease    Cerebrovascular disease, such as stroke or another condition  affecting the blood vessels or blood supply to the brain   Do you live in a group care setting? Examples include: - Nursing home - Residential care - Psychiatric treatment facility - Group home - Dormitory - Board and care home - Homeless shelter - Foster care setting Select one.    No   Not selected:    Yes   Have you ever been tested for COVID-19? Select one.    Yes   Not selected:    No   When was your most recent COVID-19 test? Select one.    More than 3 months ago   Not selected:    Today    Yesterday    2 to 4 days ago    5 to 7 days ago    7 to 14 days ago    15 to 30 days ago    1 to 3 months ago   What type of COVID-19 test did you most recently have? There are two types of COVID-19 tests: - Viral tests check if you're currently infected with COVID-19. For these tests, a nose swab or saliva sample is taken. Viral tests include self-tests and tests done at a doctor's office, lab, or testing site. - Antibody tests check if you've been infected in the past. For these tests, your blood is drawn. Antibody tests can only be done at a doctor's office, lab, or testing site. Select one.    Viral test at a doctor's office, lab, or testing site   Not selected:    Viral self-test    Antibody test   What was the result of your most recent COVID-19 test? Select one.    Negative   Not selected:    Positive    I'm not sure   Have you gotten the COVID-19 vaccine? Select one.    Yes   Not selected:    No   How many doses of the COVID-19 vaccine have you gotten? This includes boosters. Select one.    2 doses   Not selected:    1 dose    3 doses   Which COVID-19 vaccine did you get for your first dose? Check your Vaccination Record Card under Product Name/. Select one.    Moderna   Not selected:    Keshav & Keshav's Wendy Vaccine (J&J/Wendy)    Pfizer-[employer] (Pfizer)   Which COVID-19 vaccine did you get for your second dose? Check your Vaccination Record Card under Product Name/. Select one.    " Moderna   Not selected:    Keshav & [employer]'s Wendy Vaccine (J&J/Wendy)    Pfizer-BioNTech (Pfizer)   When did you get your most recent dose of the COVID-19 vaccine?    More than 14 days ago   Not selected:    Less than 48 hours (2 days) ago    48 to 72 hours (3 days) ago    3 to 5 days ago    5 to 7 days ago    7 to 14 days ago   In the last 14 days, have you traveled outside of your local community? This includes travel by car, RV, bus, train, or plane. Travel increases your chances of getting and spreading COVID-19. Select one.    No   Not selected:    Yes   In the last 14 days, have you had close contact with someone who has coronavirus (COVID-19)? \"Close contact\" means any of these: - Living in the same household as someone with COVID-19. - Caring for someone with COVID-19. - Being within 6 feet of someone with COVID-19 for a total of at least 15 minutes over a 24-hour period. For example, three 5-minute exposures for a total of 15 minutes. - Being in direct contact with respiratory droplets from someone with COVID-19 (being coughed on, kissing, sharing utensils). Select one.    No, not that I know of   Not selected:    Yes, a confirmed case    Yes, a suspected case   Thanks for completing our COVID-19 questions. Now we'll return to your symptoms. When did your symptoms start? If you know the exact date your symptoms started, choose Other and enter the month and day. Select one.    3 to 5 days ago   Not selected:    Less than 48 hours ago    6 to 9 days ago    10 to 14 days ago    2 to 4 weeks ago    More than a month ago    Other (specify)   Did your symptoms come on suddenly or gradually? Select one.    Gradually   Not selected:    Suddenly    I'm not sure   You mentioned having a fever. Do you have a fever now? Select one.    I'm not sure   Not selected:    Yes, and I've had one since my symptoms started    Yes, but I didn't have one when my symptoms started    No, it's gone now   You mentioned having " a headache. On a scale of 1 to 10, how severe is your headache pain? Select one.    Mild (1 to 3)   Not selected:    Moderate (4 to 6)    Severe (7 to 9)    Unbearable (10)    The worst headache of my life (10+)   Do you cough so hard that it's made you gag or vomit? By gag, we mean has your coughing made you choke or dry heave? Select all that apply.    Yes, my coughing has made me gag   Not selected:    Yes, my coughing has made me vomit    No   When is your cough the worst? Select all that apply.    In the morning, or when I wake up    During the day    At nighttime, or while I'm sleeping   Not selected:    I'm not sure   Are you coughing up mucus or phlegm? Select one.    Yes, a lot   Not selected:    No, my cough is dry    Yes, a little   What color is most of the mucus or phlegm that you're coughing up? Select one.    Clear   Not selected:    White/frothy    Yellow    Green    Red or pink    I'm not sure   You mentioned having a stuffy nose or sinus congestion. Do you feel pain or pressure in your sinuses?    Yes   Not selected:    No    I'm not sure   Where do you feel sinus pain or pressure?    In my forehead    Around my eyes   Not selected:    Behind my nose    In my cheeks    In my upper teeth or jaw    I'm not sure   When did you first notice your sinus pain or pressure? Select one.    Less than 5 days ago   Not selected:    5 to 9 days ago    10 to 14 days ago    2 to 4 weeks ago    1 month ago or longer   Does coughing, sneezing, or leaning forward make your sinuses feel worse? Select one.    Yes   Not selected:    No    I'm not sure   What color is your nasal drainage? Select one.    Clear   Not selected:    White    Yellow    Green    My nose is stuffed but not draining or running    I'm not sure   Is your nasal drainage thick or thin? Select one.    Thick   Not selected:    Thin    I'm not sure   Is there any drainage (mucus) going down the back of your throat? This kind of drainage is also called  "\"postnasal drip.\" Select one.    Yes   Not selected:    No    I'm not sure   Can you swallow liquids and solid foods? A sore throat may be painful when swallowing, but it shouldn't prevent you from swallowing. Select one.    Yes, with ease   Not selected:    Yes, but it's uncomfortable    Yes, but it's painful    It's hard to swallow anything because it feels like liquids and food get stuck in my throat    No, I can't swallow anything, liquid or solid foods   Since your symptoms started, have you felt dizzy? Select one.    Yes, and it makes it hard to stand, walk, or do daily activities   Not selected:    Yes, but I can continue with my regular daily activities    No   Do you have chest pain? You might also feel it as discomfort, aching, tightness, or squeezing in the chest. Select one.    Yes   Not selected:    No   Which of these is true of your chest pain? Select one.    My chest hurts only when I cough   Not selected:    My chest hurts even when I'm not coughing   Have you urinated at least 3 times in the last 24 hours? Select one.    Yes   Not selected:    No    I'm not sure   Changes in alertness or awareness may mean you need emergency care. Since your symptoms started, have you had any of these? Select all that apply.    None of the above   Not selected:    Confusion    Slurred speech    Not knowing where you are or what day it is    Difficulty staying conscious    Fainting or passing out   Do your symptoms include a whistling sound, or wheezing, when you breathe? Select one.    No   Not selected:    Yes    I'm not sure   Early in this interview, you told us you were hoarse or you'd lost your voice. How would you describe the changes to your voice? Select one.    It just sounds a little raspy   Not selected:    It's harder than usual to talk    I can barely talk at all   Is it possible that you strained your voice? Singing, yelling, or talking more or louder than usual can cause voice strain. Select one.   "  No   Not selected:    Yes    I'm not sure   Are your eyelids or the areas around your eyes puffy? Select one.    No   Not selected:    Yes, but I can easily open my eye(s)    Yes, and it's hard to open my eye(s)    Yes, and my eye(s) are completely swollen shut   Do you have any of these symptoms in your ear(s)? Select all that apply.    None of the above   Not selected:    Pain    Pressure    Fullness    Crackling or popping    Plugged or blocked sensation   Can you move your chin toward your chest?    Yes   Not selected:    No, my neck is too stiff   Are your tonsils larger than usual?    I'm not sure   Not selected:    Yes    No    I've had my tonsils removed   Is there any white or yellow pus on your tonsils?    I'm not sure   Not selected:    Yes    No   Are there red spots on the roof of your mouth or the back of your throat?    No   Not selected:    Yes    I'm not sure   Are your glands/lymph nodes swollen, or does it hurt when you touch them?    No   Not selected:    Yes    I'm not sure   People with a very high body mass index (BMI) are at higher risk for developing complications from the flu and severe illness from COVID-19. To determine your BMI, we need to know your weight and height. Please enter your weight (in pounds).    Weight   Please enter your height.    Height   In the past 2 weeks, has anyone around you (such as at school, work, or home) had a confirmed diagnosis of strep throat? A confirmed diagnosis means that a throat swab and lab test were done to verify a strep throat infection. Select one.    No   Not selected:    Yes    I'm not sure   Do you think you might have strep throat? Select one.    No   Not selected:    Yes    I'm not sure   In the past week, has anyone around you (such as at school, work, or home) had a confirmed diagnosis of the flu? A confirmed diagnosis means that a nose swab was done to verify a flu infection. Select all that apply.    I live with someone who has the flu    Not selected:    I've been within touching distance of someone who has the flu    I've walked by, or sat about 3 feet away from, someone who has the flu    I've been in the same building as someone who has the flu    I'm not sure    No   Have you ever been diagnosed with asthma? Select one.    No   Not selected:    Yes   Have you ever been prescribed albuterol to use for wheezing, cough, or shortness of breath caused by a cold, bronchitis, or pneumonia? Albuterol (ProAir, Proventil, Ventolin) is prescribed as an inhaler or a solution to be used with a nebulizer machine. Select one.    No   Not selected:    Yes    I'm not sure   Have you ever been prescribed a steroid inhaler to use for wheezing, cough, or shortness of breath caused by a cold, bronchitis, or pneumonia? Some examples of steroid inhalers include Pulmicort, Flovent, Qvar, and Alvesco. Select one.    No   Not selected:    Yes    I'm not sure   Have you ever been diagnosed with chronic obstructive pulmonary disease (COPD)? Select one.    No   Not selected:    Yes    I'm not sure   In the last year, how many times were you treated with antibiotics for a sinus infection? Select one.    None   Not selected:    1 to 3 times    4 or more times   Have you been diagnosed with a deviated septum or nasal polyps? The nose is divided into two nostrils by the septum. A crooked septum is called a deviated septum. Nasal polyps are growths inside the nose or sinuses. Select one.    No   Not selected:    Yes, but I had surgery to treat them    Yes, I have a deviated septum    Yes, I have nasal polyps    Yes, I have a deviated septum and nasal polyps    I'm not sure   Do you have a sore inside your nose that won't heal? Select one.    No   Not selected:    Yes    I'm not sure   Do you have allergies (pollen, dust mites, mold, animal dander)? Select one.    Yes   Not selected:    No    I'm not sure   What kind of allergies do you have? Select all that apply.    Seasonal  allergies (hay fever)   Not selected:    Pet allergies    Dust allergies    None of the above    I'm not sure   Do you think your symptoms could be allergy-related? Select one.    No   Not selected:    Yes    I'm not sure   Have you had a flu shot this season? Select one.    No   Not selected:    Yes, less than 2 weeks ago    Yes, 2 to 4 weeks ago    Yes, 1 to 3 months ago    Yes, 3 to 6 months ago    Yes, more than 6 months ago    I'm not sure   The flu and COVID-19 can be more serious for people with certain conditions or characteristics. These questions help us figure out if you or anyone you live with is at higher risk for complications from these infections. Do either of these statements apply to you? Select all that apply.    None of the above   Not selected:    I'm  or Native [place]    I'm a healthcare worker   Do you smoke tobacco? Select one.    No, I quit   Not selected:    Yes, every day    Yes, some days    No, never   Some conditions can put you at risk for more serious infections. Do any of these apply to you? Select all that apply.    None of the above   Not selected:    I've been hospitalized within the last 5 days    I have diabetes    I'm in close contact with a child in    Are you currently being treated for any of these conditions? Scroll to see all options. Select all that apply.    High blood pressure   Not selected:    Aspirin triad (also known as Samter's triad or ASA triad)    Asthma or hives from taking aspirin or other NSAIDs, such as ibuprofen or naproxen    Blockage or narrowing of the blood vessels of the heart    Blood dyscrasia, such anemia, leukemia, lymphoma, or myeloma    Bone marrow depression    Catecholamine-releasing paraganglioma    Blood clotting disorder    Congenital long QT syndrome    Depression    Difficulty urinating or completely emptying your bladder    Uncorrected electrolyte abnormalities    Fungal infection    Gastrointestinal (GI) bleeding     Gastrointestinal (GI) obstruction    G6PD deficiency    Recent heart attack    Irregular heartbeat or heart rhythm    Kidney disease or hemodialysis    Mononucleosis (mono)    Myasthenia gravis    Parkinson's disease    Pheochromocytoma    Reye syndrome    Seizure disorder    Ulcerative colitis    None of the above   Do you have any of these conditions that can affect the immune system? Scroll to see all options. Select all that apply.    None of these   Not selected:    History of bone marrow transplant    Chronic kidney disease    Chronic liver disease (including cirrhosis)    HIV/AIDS    Inflammatory bowel disease (Crohn's disease or ulcerative colitis)    Lupus    Moderate to severe plaque psoriasis    Multiple sclerosis    Rheumatoid arthritis    Sickle cell anemia    Alpha or beta thalassemia    History of solid organ transplant (kidney, liver, or heart)    History of spleen removal    An autoimmune disorder not listed here    A condition requiring treatment with long-term use of oral steroids (such as prednisone, prednisolone, or dexamethasone)   Have you ever been diagnosed with cancer? Select one.    No   Not selected:    Yes, I have cancer now    Yes, but I'm in remission   Have you ever had either of these conditions? Select all that apply.    No   Not selected:    Metoclopramide-associated dystonic reaction    Tardive dyskinesia   Do any of these apply to the people who live with you? Select all that apply.    None of the above   Not selected:    A child under the age of 5    An adult 65 or older    A person who is pregnant    A person who has given birth, had a miscarriage, had a pregnancy loss, or had an  in the last 2 weeks    An  or Native [place]   Does any member of your household have any of these medical conditions? Select all that apply.    None of the above   Not selected:    Asthma    Disorders of the brain, spinal cord, or nerves and muscles, such as dementia, cerebral  palsy, epilepsy, muscular dystrophy, or developmental delay    Chronic lung disease, such as COPD or cystic fibrosis    Heart disease, such as congenital heart disease, congestive heart failure, or coronary artery disease    Cerebrovascular disease, such as stroke or another condition affecting the blood vessels or blood supply to the brain    Blood disorders, such as sickle cell disease    Diabetes    Metabolic disorders such as inherited metabolic disorders or mitochondrial disease    Kidney disorders    Liver disorders    Weakened immune system due to illness or medications such as chemotherapy or steroids    Children under the age of 19 who are on long-term aspirin therapy    Extreme obesity (BMI > 40)   Have you gone through menopause? Select one.    Yes   Not selected:    No   Just a few more questions about medications, and then you're finished. Have you used any non-prescription medications or nasal sprays for your current symptoms? Examples include saline sprays, decongestants, NyQuil, and Tylenol. Select one.    Yes   Not selected:    No   Which of these non-prescription medications have you tried? Scroll to see all options. Select all that apply.    Cetirizine (Zyrtec)    Fluticasone (Flonase)   Not selected:    Acetaminophen (Tylenol)    Budesonide (Rhinocort)    Chlorpheniramine (Aller-chlor, Chlor-Trimeton)    Cromolyn (NasalCrom)    Dextromethorphan (Delsym, Robitussin, Vicks DayQuil Cough)    Diphenhydramine (Benadryl)    Fexofenadine (Allegra)    Guaifenesin (Mucinex)    Guaifenesin/dextromethorphan (Delsym DM, Mucinex DM, Robitussin DM)    Ibuprofen (Advil, Motrin, Midol)    Ketotifen (Alaway, Zaditor)    Loratadine (Alavert, Claritin)    Naphazoline-pheniramine (Naphcon-A, Opcon-A, Visine-A)    Omeprazole (Prilosec)    Oxymetazoline (Afrin)    Phenylephrine (Sudafed)    Triamcinolone (Nasacort)    None of the above   In the past month, have you taken antibiotics for similar symptoms? Examples of  antibiotics include amoxicillin, amoxicillin-clavulanate (Augmentin), penicillin, cefdinir (Omnicef), doxycycline, and clindamycin (Cleocin). Select one.    No   Not selected:    Yes    I'm not sure   Have you taken any monoamine oxidase inhibitor (MAOI) medications in the last 14 days? Examples include rasagiline (Azilect), selegiline (Eldepryl, Zelapar), isocarboxazid (Marplan), phenelzine (Nardil), and tranylcypromine (Parnate). Select one.    No   Not selected:    Yes    I'm not sure   Do you take Kynmobi or Apokyn (apomorphine)? Select one.    No   Not selected:    Yes    I'm not sure   Are you taking any other medications or supplements? On the next screen, you need to list all vitamins, supplements, non-prescription medications (such as aspirin or Aleve), and prescription medications that you're taking. Select one.    Yes   Not selected:    Yes, but I'm not sure what they are    No   Have you ever had an allergic or bad reaction to any medication? Select one.    No   Not selected:    Yes   Are you allergic to milk or to the proteins found in milk (for example, whey or casein)? A milk allergy is different from lactose intolerance. Select one.    No   Not selected:    Yes    I'm not sure   Have you ever had jaundice or liver problems as a result of taking amoxicillin-clavulanate (Augmentin)? Jaundice is a condition in which the skin and the whites of the eyes turn yellow. Select all that apply.    No, not that I know of   Not selected:    Yes, jaundice    Yes, liver problems   Have you ever had jaundice or liver problems as a result of taking azithromycin (Zithromax, Zmax)? Jaundice is a condition in which the skin and the whites of the eyes turn yellow. Select all that apply.    No, not that I know of   Not selected:    Yes, jaundice    Yes, liver problems   Do you need a doctor's note? A doctor's note confirms that you received care today and states when you can return to school or work. It does not contain  information about your diagnosis or treatment plan. Your provider will make the final decision on whether to give you a doctor's note and for how long. Doctor's notes CANNOT be backdated. We can't provide medical leave paperwork through this type of visit. If more paperwork is needed to request time off, contact your primary care provider. Select one.    7 days   Not selected:    Today only (1 day)    Today and tomorrow (2 days)    3 days    10 days    14 days    No   Is there anything else you'd like to tell us about your symptoms?   The patient did not enter any additional information.   ----------   Medical history   Medical history data does not currently exist for this patient.

## 2022-01-18 ENCOUNTER — OFFICE VISIT (OUTPATIENT)
Dept: FAMILY MEDICINE CLINIC | Facility: CLINIC | Age: 62
End: 2022-01-18

## 2022-01-18 ENCOUNTER — LAB (OUTPATIENT)
Dept: FAMILY MEDICINE CLINIC | Facility: CLINIC | Age: 62
End: 2022-01-18

## 2022-01-18 VITALS
WEIGHT: 172 LBS | OXYGEN SATURATION: 100 % | TEMPERATURE: 97.2 F | HEIGHT: 68 IN | SYSTOLIC BLOOD PRESSURE: 144 MMHG | BODY MASS INDEX: 26.07 KG/M2 | HEART RATE: 107 BPM | DIASTOLIC BLOOD PRESSURE: 92 MMHG

## 2022-01-18 DIAGNOSIS — M79.10 GENERALIZED MUSCLE ACHE: ICD-10-CM

## 2022-01-18 DIAGNOSIS — R05.9 COUGH: Primary | ICD-10-CM

## 2022-01-18 DIAGNOSIS — R43.0 LOSS OF SMELL: ICD-10-CM

## 2022-01-18 DIAGNOSIS — R61 DIAPHORESIS: ICD-10-CM

## 2022-01-18 PROCEDURE — 99213 OFFICE O/P EST LOW 20 MIN: CPT | Performed by: FAMILY MEDICINE

## 2022-01-18 PROCEDURE — U0004 COV-19 TEST NON-CDC HGH THRU: HCPCS | Performed by: FAMILY MEDICINE

## 2022-01-18 RX ORDER — METHYLPREDNISOLONE 4 MG/1
TABLET ORAL
Qty: 21 TABLET | Refills: 0 | Status: SHIPPED | OUTPATIENT
Start: 2022-01-18 | End: 2022-02-15

## 2022-01-18 RX ORDER — BENZONATATE 200 MG/1
200 CAPSULE ORAL 3 TIMES DAILY PRN
Qty: 30 CAPSULE | Refills: 0 | Status: SHIPPED | OUTPATIENT
Start: 2022-01-18 | End: 2022-02-15

## 2022-01-19 LAB — SARS-COV-2 ORF1AB RESP QL NAA+PROBE: DETECTED

## 2022-01-25 ENCOUNTER — TELEPHONE (OUTPATIENT)
Dept: FAMILY MEDICINE CLINIC | Facility: CLINIC | Age: 62
End: 2022-01-25

## 2022-01-25 DIAGNOSIS — I10 ESSENTIAL HYPERTENSION: ICD-10-CM

## 2022-01-25 RX ORDER — LISINOPRIL AND HYDROCHLOROTHIAZIDE 20; 12.5 MG/1; MG/1
2 TABLET ORAL DAILY
Qty: 180 TABLET | Refills: 1 | Status: SHIPPED | OUTPATIENT
Start: 2022-01-25 | End: 2022-07-11

## 2022-01-25 NOTE — TELEPHONE ENCOUNTER
----- Message from Renetta Stanley MA sent at 1/25/2022  3:16 PM EST -----  Regarding: FW: HBP    ----- Message -----  From: Yaa Walls  Sent: 1/25/2022   3:14 PM EST  To: [facility]  Subject: HBP                                              Hi  when you started prescribing me two tablets of my lisinopril [facility] did not follow that they sent me one tablet and now I don’t have any HBP medicine at this time I tried to change it but it’s not allowing me to would you please update my [facility] my last pill will Monday 01/31/22 Thank you

## 2022-02-15 ENCOUNTER — OFFICE VISIT (OUTPATIENT)
Dept: FAMILY MEDICINE CLINIC | Facility: CLINIC | Age: 62
End: 2022-02-15

## 2022-02-15 ENCOUNTER — LAB (OUTPATIENT)
Dept: FAMILY MEDICINE CLINIC | Facility: CLINIC | Age: 62
End: 2022-02-15

## 2022-02-15 VITALS
TEMPERATURE: 97.5 F | SYSTOLIC BLOOD PRESSURE: 100 MMHG | OXYGEN SATURATION: 100 % | HEART RATE: 72 BPM | WEIGHT: 179 LBS | BODY MASS INDEX: 27.13 KG/M2 | DIASTOLIC BLOOD PRESSURE: 67 MMHG | HEIGHT: 68 IN

## 2022-02-15 DIAGNOSIS — E78.5 HYPERLIPIDEMIA, UNSPECIFIED HYPERLIPIDEMIA TYPE: ICD-10-CM

## 2022-02-15 DIAGNOSIS — I10 PRIMARY HYPERTENSION: Primary | ICD-10-CM

## 2022-02-15 DIAGNOSIS — Z11.59 NEED FOR HEPATITIS C SCREENING TEST: ICD-10-CM

## 2022-02-15 DIAGNOSIS — I10 PRIMARY HYPERTENSION: ICD-10-CM

## 2022-02-15 DIAGNOSIS — Z00.00 PREVENTATIVE HEALTH CARE: ICD-10-CM

## 2022-02-15 LAB
ALBUMIN SERPL-MCNC: 4.1 G/DL (ref 3.5–5.2)
ALBUMIN/GLOB SERPL: 1.4 G/DL
ALP SERPL-CCNC: 90 U/L (ref 39–117)
ALT SERPL W P-5'-P-CCNC: 11 U/L (ref 1–33)
ANION GAP SERPL CALCULATED.3IONS-SCNC: 7.4 MMOL/L (ref 5–15)
AST SERPL-CCNC: 10 U/L (ref 1–32)
BILIRUB SERPL-MCNC: 0.3 MG/DL (ref 0–1.2)
BUN SERPL-MCNC: 10 MG/DL (ref 8–23)
BUN/CREAT SERPL: 12.3 (ref 7–25)
CALCIUM SPEC-SCNC: 9.6 MG/DL (ref 8.6–10.5)
CHLORIDE SERPL-SCNC: 101 MMOL/L (ref 98–107)
CHOLEST SERPL-MCNC: 203 MG/DL (ref 0–200)
CO2 SERPL-SCNC: 28.6 MMOL/L (ref 22–29)
CREAT SERPL-MCNC: 0.81 MG/DL (ref 0.57–1)
GFR SERPL CREATININE-BSD FRML MDRD: 87 ML/MIN/1.73
GLOBULIN UR ELPH-MCNC: 3 GM/DL
GLUCOSE SERPL-MCNC: 64 MG/DL (ref 65–99)
HCV AB SER DONR QL: NORMAL
HDLC SERPL-MCNC: 63 MG/DL (ref 40–60)
LDLC SERPL CALC-MCNC: 121 MG/DL (ref 0–100)
LDLC/HDLC SERPL: 1.88 {RATIO}
POTASSIUM SERPL-SCNC: 4.5 MMOL/L (ref 3.5–5.2)
PROT SERPL-MCNC: 7.1 G/DL (ref 6–8.5)
SODIUM SERPL-SCNC: 137 MMOL/L (ref 136–145)
TRIGL SERPL-MCNC: 109 MG/DL (ref 0–150)
VLDLC SERPL-MCNC: 19 MG/DL (ref 5–40)

## 2022-02-15 PROCEDURE — 99213 OFFICE O/P EST LOW 20 MIN: CPT | Performed by: FAMILY MEDICINE

## 2022-02-15 PROCEDURE — 36415 COLL VENOUS BLD VENIPUNCTURE: CPT | Performed by: FAMILY MEDICINE

## 2022-02-15 PROCEDURE — 86803 HEPATITIS C AB TEST: CPT | Performed by: FAMILY MEDICINE

## 2022-02-15 PROCEDURE — 80053 COMPREHEN METABOLIC PANEL: CPT | Performed by: FAMILY MEDICINE

## 2022-02-15 PROCEDURE — 80061 LIPID PANEL: CPT | Performed by: FAMILY MEDICINE

## 2022-02-15 NOTE — PROGRESS NOTES
Vito Walls is a 61 y.o. female.     History of Present Illness     Patient verbalized consent to the visit recording.    The patient presents today for follow-up on her blood pressure and cholesterol. She is agreeable with [facility].     The patient is doing well. Her blood pressure is great. She denies lightheadedness, dizziness, headaches, chest pain, or difficulty breathing. She states that she feels great. Her weight is sneaking back up and she is aware of this. She does not need any refills today. She is due for lab work today. She notes that she ate yogurt prior to her visit today.    She states she does not usually get vaccines. She has not received a flu vaccine or the COVID-19 vaccines.     The following portions of the patient's history were reviewed and updated as appropriate: allergies, current medications, past family history, past medical history, past social history, past surgical history, and problem list.  Past Medical History:   Diagnosis Date   • Allergic    • Fibroids    • Hypertension      Past Surgical History:   Procedure Laterality Date   • COLONOSCOPY  Sept    Could I use the Colongard   • HYSTERECTOMY      With BSO    • OOPHORECTOMY       Family History   Problem Relation Age of Onset   • Osteoporosis Mother    • Arthritis Mother            • COPD Mother    • Diabetes Father    • Hypertension Father      Social History     Socioeconomic History   • Marital status: Single   Tobacco Use   • Smoking status: Former Smoker     Packs/day: 0.00     Years: 0.00     Pack years: 0.00     Types: Cigarettes   • Smokeless tobacco: Never Used   Vaping Use   • Vaping Use: Never used   Substance and Sexual Activity   • Alcohol use: Yes     Alcohol/week: 2.0 standard drinks     Types: 2 Glasses of wine per week     Comment: social   • Drug use: No   • Sexual activity: Yes     Partners: Male     Birth control/protection: None         Current Outpatient Medications:   •  cetirizine (zyrTEC)  "10 MG tablet, TAKE 1 TABLET DAILY, Disp: 90 tablet, Rfl: 3  •  estradiol (ESTRACE) 2 MG tablet, TAKE 1 TABLET DAILY, Disp: 90 tablet, Rfl: 3  •  fluticasone (FLONASE) 50 MCG/ACT nasal spray, 2 sprays into the nostril(s) as directed by provider Daily., Disp: 9.9 mL, Rfl: 1  •  lisinopril-hydrochlorothiazide (PRINZIDE,ZESTORETIC) 20-12.5 MG per tablet, Take 2 tablets by mouth Daily., Disp: 180 tablet, Rfl: 1  •  triamcinolone (KENALOG) 0.5 % cream, As Needed., Disp: , Rfl: 0    Review of Systems   Review of systems was performed, and pertinent findings are noted in the HPI.    /67 (BP Location: Left arm, Patient Position: Sitting, Cuff Size: Adult)   Pulse 72   Temp 97.5 °F (36.4 °C) (Temporal)   Ht 172.7 cm (68\")   Wt 81.2 kg (179 lb)   SpO2 100%   Breastfeeding No   BMI 27.22 kg/m²     Objective   Physical Exam  Vitals and nursing note reviewed.   Constitutional:       General: She is not in acute distress.     Appearance: She is well-developed.      Comments: Overweight.   Well-groomed.   HENT:      Head: Normocephalic and atraumatic.   Neck:      Thyroid: No thyromegaly.   Cardiovascular:      Rate and Rhythm: Normal rate and regular rhythm.      Heart sounds: Normal heart sounds. No murmur heard.  No friction rub. No gallop.    Pulmonary:      Effort: Pulmonary effort is normal. No respiratory distress.      Breath sounds: Normal breath sounds. No wheezing or rales.   Musculoskeletal:      Cervical back: Neck supple.   Lymphadenopathy:      Cervical: No cervical adenopathy.   Skin:     General: Skin is warm and dry.   Neurological:      Mental Status: She is alert.   Psychiatric:         Mood and Affect: Mood normal.       Assessment/Plan   Problems Addressed this Visit        Cardiac and Vasculature    Hypertension - Primary    Relevant Orders    Comprehensive Metabolic Panel    Lipid Panel    Hyperlipidemia    Relevant Orders    Comprehensive Metabolic Panel    Lipid Panel      Diagnoses       " Codes Comments    Primary hypertension    -  Primary ICD-10-CM: I10  ICD-9-CM: 401.9     Hyperlipidemia, unspecified hyperlipidemia type     ICD-10-CM: E78.5  ICD-9-CM: 272.4         1. Hypertension and Hyperlipidemia.   - Blood pressure is well controlled on lisinopril-hydrochlorothiazide 12.5 mg taking 2 tablets once a day. She is not currently on medication for her cholesterol. She is due for a CMP and a lipid and those were ordered. She was counseled on the need for weight loss. She refuses vaccines. I will see her back in 1 year.           Transcribed from ambient dictation for Crystal Guillen MD by Pam Presley.  02/16/22   13:00 EST

## 2022-07-11 DIAGNOSIS — I10 ESSENTIAL HYPERTENSION: ICD-10-CM

## 2022-07-11 DIAGNOSIS — J30.9 ALLERGIC RHINITIS, UNSPECIFIED SEASONALITY, UNSPECIFIED TRIGGER: ICD-10-CM

## 2022-07-11 RX ORDER — LISINOPRIL AND HYDROCHLOROTHIAZIDE 20; 12.5 MG/1; MG/1
TABLET ORAL
Qty: 180 TABLET | Refills: 1 | Status: SHIPPED | OUTPATIENT
Start: 2022-07-11 | End: 2023-03-01 | Stop reason: SDUPTHER

## 2022-07-11 RX ORDER — CETIRIZINE HYDROCHLORIDE 10 MG/1
TABLET ORAL
Qty: 90 TABLET | Refills: 1 | Status: SHIPPED | OUTPATIENT
Start: 2022-07-11 | End: 2023-03-01 | Stop reason: SDUPTHER

## 2022-08-18 ENCOUNTER — OFFICE VISIT (OUTPATIENT)
Dept: FAMILY MEDICINE CLINIC | Facility: CLINIC | Age: 62
End: 2022-08-18

## 2022-08-18 ENCOUNTER — HOSPITAL ENCOUNTER (OUTPATIENT)
Dept: GENERAL RADIOLOGY | Facility: HOSPITAL | Age: 62
Discharge: HOME | End: 2022-08-18

## 2022-08-18 VITALS
SYSTOLIC BLOOD PRESSURE: 115 MMHG | HEIGHT: 68 IN | BODY MASS INDEX: 25.46 KG/M2 | OXYGEN SATURATION: 99 % | WEIGHT: 168 LBS | HEART RATE: 70 BPM | DIASTOLIC BLOOD PRESSURE: 70 MMHG

## 2022-08-18 DIAGNOSIS — M79.602 LEFT ARM PAIN: ICD-10-CM

## 2022-08-18 DIAGNOSIS — M79.602 LEFT ARM PAIN: Primary | ICD-10-CM

## 2022-08-18 PROCEDURE — 73060 X-RAY EXAM OF HUMERUS: CPT

## 2022-08-18 PROCEDURE — 73090 X-RAY EXAM OF FOREARM: CPT

## 2022-08-18 PROCEDURE — 99213 OFFICE O/P EST LOW 20 MIN: CPT | Performed by: FAMILY MEDICINE

## 2022-08-18 RX ORDER — NAPROXEN 500 MG/1
500 TABLET ORAL 2 TIMES DAILY WITH MEALS
Qty: 60 TABLET | Refills: 1 | Status: SHIPPED | OUTPATIENT
Start: 2022-08-18

## 2022-08-18 NOTE — PATIENT INSTRUCTIONS
Warm compresses to arm  Do not take ibuprofen, advil, motrin, or aleve with the new prescription

## 2022-08-18 NOTE — PROGRESS NOTES
Answers for HPI/ROS submitted by the patient on 2022  Please describe your symptoms.: I’m concerned about my lert arm I have been having a nagging pain for about a month now it hurts every day sometimes when I am sitting down it just starts throbbing  I have not been exercising so I’m not sure why I’m getting this pain   Have you had these symptoms before?: No  How long have you been having these symptoms?: Greater than 2 weeks  Please list any medications you are currently taking for this condition.: Tylenol , Arm brace  Please describe any probable cause for these symptoms. : None  What is the primary reason for your visit?: Other    Subjective   Yaa Walls is a 62 y.o. female.     Arm Pain        The patient comes in with complaints of left arm pain for a month. She is left handed.    Left arm pain  The patient states the pain is in the upper arm about the midportion of the upper arm down to about the midportion of her lower arm. She describes the pain as aching and throbbing. She denies any trauma to the arm. The patient states she is able to  a gallon of milk. She has been taking Tylenol and Advil with minimal relief. She denies any numbness or tingling. She reports when she sits she can feel it throbbing.     The following portions of the patient's history were reviewed and updated as appropriate: allergies, current medications, past family history, past medical history, past social history, past surgical history, and problem list.  Past Medical History:   Diagnosis Date   • Allergic    • Fibroids    • Hypertension      Past Surgical History:   Procedure Laterality Date   • COLONOSCOPY  Sept    Could I use the Colongard   • HYSTERECTOMY      With BSO    • OOPHORECTOMY       Family History   Problem Relation Age of Onset   • Osteoporosis Mother    • Arthritis Mother            • COPD Mother    • Diabetes Father    • Hypertension Father    • Arthritis Father           "    Social History     Socioeconomic History   • Marital status: Single   Tobacco Use   • Smoking status: Former Smoker     Packs/day: 0.00     Years: 0.00     Pack years: 0.00     Types: Cigarettes   • Smokeless tobacco: Never Used   Vaping Use   • Vaping Use: Never used   Substance and Sexual Activity   • Alcohol use: Yes     Alcohol/week: 2.0 standard drinks     Types: 2 Glasses of wine per week     Comment: social   • Drug use: No   • Sexual activity: Yes     Partners: Male     Birth control/protection: None         Current Outpatient Medications:   •  cetirizine (zyrTEC) 10 MG tablet, TAKE 1 TABLET DAILY, Disp: 90 tablet, Rfl: 1  •  estradiol (ESTRACE) 2 MG tablet, TAKE 1 TABLET DAILY, Disp: 90 tablet, Rfl: 3  •  fluticasone (FLONASE) 50 MCG/ACT nasal spray, 2 sprays into the nostril(s) as directed by provider Daily., Disp: 9.9 mL, Rfl: 1  •  lisinopril-hydrochlorothiazide (PRINZIDE,ZESTORETIC) 20-12.5 MG per tablet, TAKE 2 TABLETS DAILY, Disp: 180 tablet, Rfl: 1  •  naproxen (Naprosyn) 500 MG tablet, Take 1 tablet by mouth 2 (Two) Times a Day With Meals., Disp: 60 tablet, Rfl: 1  •  triamcinolone (KENALOG) 0.5 % cream, As Needed., Disp: , Rfl: 0    Review of Systems   ROS done and noted in HPI    /70 (BP Location: Left arm, Patient Position: Sitting, Cuff Size: Adult)   Pulse 70   Ht 172.7 cm (68\")   Wt 76.2 kg (168 lb)   SpO2 99%   BMI 25.54 kg/m²       Objective   Physical Exam  Constitutional:       General: She is not in acute distress.     Appearance: She is normal weight.      Comments: She is well nourished, well developed.   Cardiovascular:      Pulses:           Radial pulses are 2+ on the right side and 2+ on the left side.      Comments: Her radial and ulnar pulses are 2+ and symmetric.  Musculoskeletal:      Left shoulder: No swelling. Normal range of motion. Normal strength.      Left upper arm: No swelling or tenderness.      Left elbow: No swelling. Normal range of motion.      Left " forearm: No swelling or tenderness.      Left wrist: No swelling. Normal range of motion.      Left hand: No swelling. Normal range of motion. Normal strength.      Comments: Full range of motion of the left arm from the shoulder, elbow and wrist and fingers.  Her  strength is 5 out of 5.  There's no swelling, erythema, ecchymosis of any area along the arm. Along the arm, there is some mild tenderness along the lateral aspect of the humerus and the forearm. There is no swelling of any of the arms or any of the joints on her left arm. Her  strength is 5 out of five.           Assessment & Plan   Problems Addressed this Visit    None     Visit Diagnoses     Left arm pain    -  Primary    Relevant Orders    XR Humerus Left (In Office) (Completed)    XR Forearm 2 View Left (In Office) (Completed)      Diagnoses       Codes Comments    Left arm pain    -  Primary ICD-10-CM: M79.602  ICD-9-CM: 729.5           1. Left arm pain  I have sent her for x-rays of her upper and lower arm. I have put her on naproxen and told her to stop taking the ibuprofen and Aleve that she was taking over the counter. I explained to her that she can take the Tylenol if needed. I have recommended that she do warm compresses. I suspect that this is a simple muscle strain.    Transcribed from ambient dictation for Crystal Guillen MD by Paz Dominguez.  08/18/22   10:39 EDT    Patient verbalized consent to the visit recording.

## 2022-09-12 ENCOUNTER — TELEPHONE (OUTPATIENT)
Dept: FAMILY MEDICINE CLINIC | Facility: CLINIC | Age: 62
End: 2022-09-12

## 2022-09-14 NOTE — TELEPHONE ENCOUNTER
Is She a long way from the bathroom at her workstation?  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

## 2022-10-07 DIAGNOSIS — Z12.31 BREAST CANCER SCREENING BY MAMMOGRAM: Primary | ICD-10-CM

## 2022-10-07 DIAGNOSIS — N95.1 HOT FLASHES DUE TO MENOPAUSE: ICD-10-CM

## 2022-10-10 RX ORDER — ESTRADIOL 2 MG/1
TABLET ORAL
Qty: 90 TABLET | Refills: 0 | Status: SHIPPED | OUTPATIENT
Start: 2022-10-10 | End: 2023-03-01 | Stop reason: SDUPTHER

## 2022-10-10 NOTE — TELEPHONE ENCOUNTER
I sent a refill on her estrogen but she has not had a mammogram in a year.  I will put the order in the computer.  She must do the mammogram soon or there will be no further refills.

## 2022-10-11 ENCOUNTER — TRANSCRIBE ORDERS (OUTPATIENT)
Dept: FAMILY MEDICINE CLINIC | Facility: CLINIC | Age: 62
End: 2022-10-11

## 2022-10-11 DIAGNOSIS — Z12.31 SCREENING MAMMOGRAM FOR BREAST CANCER: Primary | ICD-10-CM

## 2022-10-31 ENCOUNTER — HOSPITAL ENCOUNTER (OUTPATIENT)
Dept: MAMMOGRAPHY | Facility: HOSPITAL | Age: 62
Discharge: HOME | End: 2022-10-31
Admitting: FAMILY MEDICINE

## 2022-10-31 DIAGNOSIS — Z12.31 SCREENING MAMMOGRAM FOR BREAST CANCER: ICD-10-CM

## 2022-10-31 PROCEDURE — 77067 SCR MAMMO BI INCL CAD: CPT

## 2022-10-31 PROCEDURE — 77063 BREAST TOMOSYNTHESIS BI: CPT

## 2022-12-29 ENCOUNTER — TELEPHONE (OUTPATIENT)
Dept: FAMILY MEDICINE CLINIC | Facility: CLINIC | Age: 62
End: 2022-12-29
Payer: COMMERCIAL

## 2022-12-29 NOTE — TELEPHONE ENCOUNTER
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

## 2023-01-03 NOTE — TELEPHONE ENCOUNTER
Paperwork is in your bin  She sent the paperwork through [employer] so form fee will need to be collected and form copied

## 2023-03-01 ENCOUNTER — PATIENT MESSAGE (OUTPATIENT)
Dept: FAMILY MEDICINE CLINIC | Facility: CLINIC | Age: 63
End: 2023-03-01
Payer: COMMERCIAL

## 2023-03-01 DIAGNOSIS — J30.9 ALLERGIC RHINITIS, UNSPECIFIED SEASONALITY, UNSPECIFIED TRIGGER: ICD-10-CM

## 2023-03-01 DIAGNOSIS — N95.1 HOT FLASHES DUE TO MENOPAUSE: ICD-10-CM

## 2023-03-01 DIAGNOSIS — I10 ESSENTIAL HYPERTENSION: ICD-10-CM

## 2023-03-01 RX ORDER — ESTRADIOL 2 MG/1
2 TABLET ORAL DAILY
Qty: 90 TABLET | Refills: 0 | Status: SHIPPED | OUTPATIENT
Start: 2023-03-01

## 2023-03-01 RX ORDER — CETIRIZINE HYDROCHLORIDE 10 MG/1
10 TABLET ORAL DAILY
Qty: 90 TABLET | Refills: 1 | Status: SHIPPED | OUTPATIENT
Start: 2023-03-01

## 2023-03-01 RX ORDER — LISINOPRIL AND HYDROCHLOROTHIAZIDE 20; 12.5 MG/1; MG/1
2 TABLET ORAL DAILY
Qty: 180 TABLET | Refills: 1 | Status: SHIPPED | OUTPATIENT
Start: 2023-03-01

## 2023-08-09 ENCOUNTER — OFFICE VISIT (OUTPATIENT)
Dept: FAMILY MEDICINE CLINIC | Facility: CLINIC | Age: 63
End: 2023-08-09
Payer: COMMERCIAL

## 2023-08-09 VITALS
SYSTOLIC BLOOD PRESSURE: 122 MMHG | TEMPERATURE: 97.7 F | HEART RATE: 53 BPM | BODY MASS INDEX: 26.98 KG/M2 | WEIGHT: 178 LBS | OXYGEN SATURATION: 98 % | DIASTOLIC BLOOD PRESSURE: 78 MMHG | HEIGHT: 68 IN

## 2023-08-09 DIAGNOSIS — H05.20 EXOPHTHALMOS: Primary | ICD-10-CM

## 2023-08-09 DIAGNOSIS — E78.5 HYPERLIPIDEMIA, UNSPECIFIED HYPERLIPIDEMIA TYPE: ICD-10-CM

## 2023-08-09 DIAGNOSIS — I10 PRIMARY HYPERTENSION: ICD-10-CM

## 2023-08-09 DIAGNOSIS — E05.90 HYPERTHYROIDISM: ICD-10-CM

## 2023-08-09 DIAGNOSIS — G89.29 CHRONIC BILATERAL LOW BACK PAIN WITHOUT SCIATICA: ICD-10-CM

## 2023-08-09 DIAGNOSIS — R73.9 HYPERGLYCEMIA: ICD-10-CM

## 2023-08-09 DIAGNOSIS — M54.50 CHRONIC BILATERAL LOW BACK PAIN WITHOUT SCIATICA: ICD-10-CM

## 2023-08-09 PROCEDURE — 99214 OFFICE O/P EST MOD 30 MIN: CPT | Performed by: FAMILY MEDICINE

## 2023-08-09 RX ORDER — MELOXICAM 15 MG/1
15 TABLET ORAL DAILY
Qty: 90 TABLET | Refills: 1 | Status: SHIPPED | OUTPATIENT
Start: 2023-08-09

## 2023-08-09 NOTE — PROGRESS NOTES
"Answers submitted by the patient for this visit:  Other (Submitted on 8/3/2023)  Please describe your symptoms.: In April of this year, I was having some bluriness with  my eyesight so I went to the Eye doctor and they sent me to getting MRI and some blood work then and he told me that I would need to come and see my PCP. Let's have some test running about my thyroid on my A1C. He said that he sent the paperwork over to doctor Deirdre.  So also too, I've been having issues with my lower back as well It seems like when I stand up for too long or sit too long. My back hurts all the time. I do take over-the-counter medicine for the pain but nothing seems to work  It hurts so bad I can't even sit up sometimes  Have you had these symptoms before?: Yes  How long have you been having these symptoms?: Greater than 2 weeks  Please list any medications you are currently taking for this condition.: Lisinopril HBP, Estradiol 2mg, Citrozen Allergy  Primary Reason for Visit (Submitted on 8/3/2023)  What is the primary reason for your visit?: Other  Subjective   Yaa Walls is a 63 y.o. female.     History of Present Illness  She comes in for follow-up after she was seen by her eye doctor with some blurred vision.  Exam and MRI are suggestive of exophthalmos and she needs to be tested for thyroid disease.  She is also complaining of some low back pain     Yaa Walls is a 63-year-old female who presents today for follow-up after she had blurred vision, saw her eye doctor, and had an MRI of her head. There is concern about exophthalmos and she was told to be tested for thyroid disease and have a HbA1c performed. She is also due to follow up on her blood pressure and cholesterol.    The patient complains of back pain that has been ongoing for approximately 6 months. She states the pain is worse when she gets up and when she sits down. She reports \"maybe\" having occasional paresthesia in her legs.    The patient's blood pressure " is beautiful today. She denies any chest pain or trouble breathing.      The following portions of the patient's history were reviewed and updated as appropriate: allergies, current medications, past family history, past medical history, past social history, past surgical history, and problem list.  Past Medical History:   Diagnosis Date    Allergic     Arthritis     Fibroids     Hypertension     Low back pain      Past Surgical History:   Procedure Laterality Date    COLONOSCOPY  Sept    Could I use the Colongard    HYSTERECTOMY      With BSO     OOPHORECTOMY       Family History   Problem Relation Age of Onset    Osteoporosis Mother     Arthritis Mother             COPD Mother     Diabetes Father     Hypertension Father     Arthritis Father              Social History     Socioeconomic History    Marital status: Single   Tobacco Use    Smoking status: Passive Smoke Exposure - Never Smoker    Smokeless tobacco: Never   Vaping Use    Vaping Use: Never used   Substance and Sexual Activity    Alcohol use: Yes     Alcohol/week: 2.0 standard drinks     Types: 2 Glasses of wine per week     Comment: social    Drug use: No    Sexual activity: Yes     Partners: Male     Birth control/protection: None         Current Outpatient Medications:     cetirizine (zyrTEC) 10 MG tablet, Take 1 tablet by mouth Daily., Disp: 90 tablet, Rfl: 1    estradiol (ESTRACE) 2 MG tablet, Take 1 tablet by mouth Daily., Disp: 90 tablet, Rfl: 0    fluticasone (FLONASE) 50 MCG/ACT nasal spray, 2 sprays into the nostril(s) as directed by provider Daily., Disp: 9.9 mL, Rfl: 1    lisinopril-hydrochlorothiazide (PRINZIDE,ZESTORETIC) 20-12.5 MG per tablet, Take 2 tablets by mouth Daily., Disp: 180 tablet, Rfl: 1    meloxicam (Mobic) 15 MG tablet, Take 1 tablet by mouth Daily., Disp: 90 tablet, Rfl: 1    Review of Systems  A review of systems was performed, and pertinent findings are noted in the HPI.    /78 (BP Location: Left arm,  "Patient Position: Sitting, Cuff Size: Large Adult)   Pulse 53   Temp 97.7 øF (36.5 øC) (Temporal)   Ht 172.7 cm (68\")   Wt 80.7 kg (178 lb)   SpO2 98%   BMI 27.06 kg/mý       Objective   Physical Exam  Vitals and nursing note reviewed.   Constitutional:       Appearance: Normal appearance.   Neck:      Thyroid: No thyromegaly.   Cardiovascular:      Rate and Rhythm: Normal rate and regular rhythm.      Heart sounds: Normal heart sounds.   Pulmonary:      Effort: Pulmonary effort is normal.      Breath sounds: Normal breath sounds.   Musculoskeletal:      Cervical back: Neck supple.      Lumbar back: Normal.      Right lower leg: No edema.      Left lower leg: No edema.   Lymphadenopathy:      Cervical: No cervical adenopathy.   Neurological:      Mental Status: She is alert.      Comments: Lower extremity strength and DTRs are normal         Assessment & Plan   Problems Addressed this Visit          Cardiac and Vasculature    Hypertension    Relevant Orders    Comprehensive Metabolic Panel    Lipid Panel    Hyperlipidemia    Relevant Orders    Comprehensive Metabolic Panel    Lipid Panel       Endocrine and Metabolic    Hyperglycemia     Other Visit Diagnoses       Exophthalmos    -  Primary    Relevant Orders    Ambulatory Referral to Endocrinology    Chronic bilateral low back pain without sciatica        Relevant Orders    XR Spine Lumbar 2 or 3 View    Ambulatory Referral to Physical Therapy Evaluate and treat    Hyperthyroidism        Relevant Orders    Ambulatory Referral to Endocrinology          Diagnoses         Codes Comments    Exophthalmos    -  Primary ICD-10-CM: H05.20  ICD-9-CM: 376.30     Primary hypertension     ICD-10-CM: I10  ICD-9-CM: 401.9     Hyperlipidemia, unspecified hyperlipidemia type     ICD-10-CM: E78.5  ICD-9-CM: 272.4     Chronic bilateral low back pain without sciatica     ICD-10-CM: M54.50, G89.29  ICD-9-CM: 724.2, 338.29     Hyperthyroidism     ICD-10-CM: E05.90  ICD-9-CM: " 242.90     Hyperglycemia     ICD-10-CM: R73.9  ICD-9-CM: 790.29           1. Exophthalmos  2.Hyperthyroidism  - Her TSH was less than 0.005 mIU/L.  - I am getting her in to see the endocrinologist for further evaluation.    3. Primary hypertension  - She will continue her current medications.  - I have ordered a CMP and a lipid.    4. Hyperlipidemia  - I have ordered a CMP and a lipid.    5. Chronic bilateral low back pain  - I am sending her for physical therapy and an x-ray of her low back and I have started her on Mobic.  - I have explained to her that she needs to stop ibuprofen and naproxen, but that she can take Tylenol.    6. Hyperglycemia  - I have counseled her on the need for dietary modifications.    I will see her back in 6 months.      Transcribed from ambient dictation for Crystal Guillen MD by Sherri Mansfield.  08/09/23   09:23 EDT    Patient or patient representative verbalized consent to the visit recording.  I have personally performed the services described in this document as transcribed by the above individual, and it is both accurate and complete.

## 2023-08-25 DIAGNOSIS — I10 ESSENTIAL HYPERTENSION: ICD-10-CM

## 2023-08-25 RX ORDER — LISINOPRIL AND HYDROCHLOROTHIAZIDE 20; 12.5 MG/1; MG/1
TABLET ORAL
Qty: 180 TABLET | Refills: 3 | Status: SHIPPED | OUTPATIENT
Start: 2023-08-25

## 2023-09-06 DIAGNOSIS — N95.1 HOT FLASHES DUE TO MENOPAUSE: ICD-10-CM

## 2023-09-07 RX ORDER — ESTRADIOL 2 MG/1
TABLET ORAL
Qty: 90 TABLET | Refills: 0 | Status: SHIPPED | OUTPATIENT
Start: 2023-09-07

## 2023-09-11 ENCOUNTER — LAB (OUTPATIENT)
Dept: LAB | Facility: HOSPITAL | Age: 63
End: 2023-09-11
Payer: COMMERCIAL

## 2023-09-11 ENCOUNTER — OFFICE VISIT (OUTPATIENT)
Dept: ENDOCRINOLOGY | Facility: CLINIC | Age: 63
End: 2023-09-11
Payer: COMMERCIAL

## 2023-09-11 VITALS
SYSTOLIC BLOOD PRESSURE: 130 MMHG | DIASTOLIC BLOOD PRESSURE: 70 MMHG | HEIGHT: 68 IN | HEART RATE: 70 BPM | OXYGEN SATURATION: 99 % | BODY MASS INDEX: 25.46 KG/M2 | WEIGHT: 168 LBS

## 2023-09-11 DIAGNOSIS — I10 PRIMARY HYPERTENSION: ICD-10-CM

## 2023-09-11 DIAGNOSIS — E05.90 HYPERTHYROIDISM: Primary | ICD-10-CM

## 2023-09-11 DIAGNOSIS — H05.20 EXOPHTHALMOS: ICD-10-CM

## 2023-09-11 DIAGNOSIS — E66.3 OVERWEIGHT: ICD-10-CM

## 2023-09-11 DIAGNOSIS — E05.90 HYPERTHYROIDISM: ICD-10-CM

## 2023-09-11 DIAGNOSIS — E78.2 MIXED HYPERLIPIDEMIA: ICD-10-CM

## 2023-09-11 LAB
T4 FREE SERPL-MCNC: 1.76 NG/DL (ref 0.93–1.7)
TSH SERPL DL<=0.05 MIU/L-ACNC: <0.005 UIU/ML (ref 0.27–4.2)

## 2023-09-11 PROCEDURE — 84439 ASSAY OF FREE THYROXINE: CPT

## 2023-09-11 PROCEDURE — 84443 ASSAY THYROID STIM HORMONE: CPT

## 2023-09-11 PROCEDURE — 84445 ASSAY OF TSI GLOBULIN: CPT

## 2023-09-11 PROCEDURE — 36415 COLL VENOUS BLD VENIPUNCTURE: CPT

## 2023-09-11 PROCEDURE — 99204 OFFICE O/P NEW MOD 45 MIN: CPT | Performed by: INTERNAL MEDICINE

## 2023-09-11 NOTE — PATIENT INSTRUCTIONS
Please,    - Get blood work done today and before next visit.  - Depending on to your blood work today we will plan for methimazole therapy, that will be half pill of 5 mg daily.    Follow-up in my clinic in 10 weeks time that is around 8 weeks after starting new medication.    Thank you for your visit today.    If you have any questions or concerns please feel free to reach out of the office.

## 2023-09-11 NOTE — PROGRESS NOTES
-----------------------------------------------------------------  ENDOCRINE CLINIC NOTE  -----------------------------------------------------------------        PATIENT NAME: Yaa Walls  PATIENT : 1960 AGE: 63 y.o.  MRN NUMBER: [id number]  PRIMARY CARE: Crystal Guillen MD    ==========================================================================    CHIEF COMPLAINT: Hyperthyroidism  DATE OF SERVICE: 2023         ==========================================================================    HPI / SUBJECTIVE    63 y.o. female is seen in the clinic today for evaluation and management of newly diagnosed hyperthyroidism.  Patient reports since 2023 she has been experiencing slowly progressive vision changes for which initially she went to optometrist and had change in her glasses prescription.  But patient continued to have same symptoms and was sent to see an ophthalmologist.  Patient was eval by ophthalmology and an MRI orbits there was showing changes for exophthalmos.  Patient was referred for blood work that include thyroid profile which showed significantly suppressed TSH with normal free T4 and T3 levels suggestive of hyperthyroidism and patient was referred to endocrinology clinic for further evaluation.  Patient denies any tremors or racing of heart.  Is any chest pain or shortness of breath.  Do reports to have some hot intolerance.  Denies any weight changes.  Denies any change in mood.  Patient reports to have eye itchiness.  Denies any neck swelling.  Reports to have good sleep.  Family history significant for osteoporosis.  Denies any family history of thyroid disorder.  Denies any previous work-up for thyroid in the past.  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  Denies any recent  infection.  Patient is maintained on hormone replacement therapy since hysterectomy since age 41.      ==========================================================================                                                PAST MEDICAL HISTORY    Past Medical History:   Diagnosis Date    Allergic     Arthritis     Fibroids     Hypertension     Hyperthyroidism     Low back pain        ==========================================================================    PAST SURGICAL HISTORY    Past Surgical History:   Procedure Laterality Date    COLONOSCOPY  Sept    Could I use the [place]    HYSTERECTOMY      With BSO     OOPHORECTOMY         ==========================================================================    FAMILY HISTORY    Family History   Problem Relation Age of Onset    Osteoporosis Mother     Arthritis Mother             COPD Mother     Diabetes Father     Hypertension Father     Arthritis Father                ==========================================================================    SOCIAL HISTORY    Social History     Socioeconomic History    Marital status: Single    Number of children: 0    Years of education: 13   Tobacco Use    Smoking status: Some Days     Packs/day: 0.00     Years: 0.00     Pack years: 0.00     Types: Cigarettes     Passive exposure: Yes    Smokeless tobacco: Never   Vaping Use    Vaping Use: Never used   Substance and Sexual Activity    Alcohol use: Yes     Alcohol/week: 2.0 standard drinks     Types: 2 Glasses of wine per week     Comment: social    Drug use: No    Sexual activity: Yes     Partners: Male     Birth control/protection: None       ==========================================================================    MEDICATIONS      Current Outpatient Medications:     cetirizine (zyrTEC) 10 MG tablet, Take 1 tablet by mouth Daily., Disp: 90 tablet, Rfl: 1    estradiol (ESTRACE) 2 MG tablet, Take 1 tablet by mouth once daily, Disp: 90 tablet, Rfl: 0     fluticasone (FLONASE) 50 MCG/ACT nasal spray, 2 sprays into the nostril(s) as directed by provider Daily., Disp: 9.9 mL, Rfl: 1    lisinopril-hydrochlorothiazide (PRINZIDE,ZESTORETIC) 20-12.5 MG per tablet, Take 2 tablets by mouth once daily, Disp: 180 tablet, Rfl: 3    meloxicam (Mobic) 15 MG tablet, Take 1 tablet by mouth Daily., Disp: 90 tablet, Rfl: 1    ==========================================================================    ALLERGIES    No Known Allergies    ==========================================================================    OBJECTIVE    Vitals:    09/11/23 1022   BP: 130/70   Pulse: 70   SpO2: 99%     Body mass index is 25.54 kg/m².     General: Alert, cooperative, no acute distress  Thyroid:  no enlargement/tenderness/palpable nodules  Lungs: Clear to auscultation bilaterally, respirations unlabored  Heart: Regular rate and rhythm, S1 and S2 normal, no murmur, rub or gallop  Abdomen: Soft, NT, ND and Bowel sounds Positive  Extremities:  Extremities normal, atraumatic, no cyanosis or edema    ==========================================================================    LAB EVALUATION    Lab Results   Component Value Date    GLUCOSE 64 (L) 02/15/2022    BUN 10 02/15/2022    CREATININE 0.81 02/15/2022    EGFRIFAFRI 87 02/15/2022    BCR 12.3 02/15/2022    K 4.5 02/15/2022    CO2 28.6 02/15/2022    CALCIUM 9.6 02/15/2022    ALBUMIN 4.10 02/15/2022    LABIL2 1.1 02/13/2018    AST 10 02/15/2022    ALT 11 02/15/2022    CHOL 203 (H) 02/15/2022    TRIG 109 02/15/2022    HDL 63 (H) 02/15/2022     (H) 02/15/2022     No results found for: HGBA1C  Lab Results   Component Value Date    CREATININE 0.81 02/15/2022     No results found for: TSH, FREET4, THYROIDAB    ==========================================================================    ASSESSMENT AND PLAN    Assessment:  #Hyperthyroidism  #Exophthalmos  #Hyperlipidemia  #Hypertension  #Overweight with BMI of 45.54    Plan:  - Discussed with  "patient in detail about pathophysiology of thyroid gland  - Discussed with patient in detail about diagnosis of hyperthyroidism  - Patient have no family history significant for thyroid disorder but patient served in [employer] and 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, she may have been exposed to radioactive material  - Patient was recently diagnosed with exophthalmos leading to work-up for thyroid disorder therefore my clinical suspicion that she is suffering from Graves' disease  - We will order TSH and free T4 levels along with TSI levels to confirm the diagnosis of hyperthyroidism also to confirm the diagnosis of Graves' disease  -Depending on to the results we will start patient on methimazole therapy  - Discussed with patient about different therapeutic option that includes surgery and radioactive iodine therapy as well, for now we will start with methimazole therapy  -Benefit and side effect of methimazole therapy discussed with patient  - Discussed with patient about rationale to treat hyperthyroidism is to prevent any cardiac issues and also to prevent osteoporosis to which she verbalized understanding  - After reviewing blood work most likely will start patient on methimazole 2.5 mg p.o. daily with repeat thyroid profile in 8 weeks time  - Blood work today and before next visit    Thank you for courtesy of consultation.    Return to clinic: 10 weeks    Entire assessment and plan was discussed and counseled the patient in detail to which patient verbalized understanding and agreed with care.  Answered all queries and concerns.    This note was created using voice recognition software and is inherently subject to errors including those of syntax and \"sound-alike\" substitutions which may escape proofreading.  In such instances, original meaning may be extrapolated by contextual derivation.    Note: Portions of this note may have been copied from previous notes but documentation have been reviewed and edited as necessary to " support clinical decision making for today's visit.    ==========================================================================    INFORMATION PROVIDED TO PATIENT    Patient Instructions   Please,    - Get blood work done today and before next visit.  - Depending on to your blood work today we will plan for methimazole therapy, that will be half pill of 5 mg daily.    Follow-up in my clinic in 10 weeks time that is around 8 weeks after starting new medication.    Thank you for your visit today.    If you have any questions or concerns please feel free to reach out of the office.       ==========================================================================  Julian Mullins MD  Department of Endocrine, Diabetes and Metabolism  [facility] [place]  ==========================================================================

## 2023-09-14 LAB — TSI SER-ACNC: 3.13 IU/L (ref 0–0.55)

## 2023-10-19 ENCOUNTER — HOSPITAL ENCOUNTER (OUTPATIENT)
Dept: GENERAL RADIOLOGY | Facility: HOSPITAL | Age: 63
Discharge: HOME | End: 2023-10-19
Admitting: FAMILY MEDICINE
Payer: COMMERCIAL

## 2023-10-19 DIAGNOSIS — G89.29 CHRONIC BILATERAL LOW BACK PAIN WITHOUT SCIATICA: ICD-10-CM

## 2023-10-19 DIAGNOSIS — M54.50 CHRONIC BILATERAL LOW BACK PAIN WITHOUT SCIATICA: ICD-10-CM

## 2023-10-19 PROCEDURE — 72100 X-RAY EXAM L-S SPINE 2/3 VWS: CPT

## 2023-10-20 ENCOUNTER — OFFICE VISIT (OUTPATIENT)
Dept: FAMILY MEDICINE CLINIC | Facility: CLINIC | Age: 63
End: 2023-10-20
Payer: COMMERCIAL

## 2023-10-20 ENCOUNTER — E-VISIT (OUTPATIENT)
Dept: ADMINISTRATIVE | Facility: OTHER | Age: 63
End: 2023-10-20
Payer: COMMERCIAL

## 2023-10-20 VITALS
BODY MASS INDEX: 25.01 KG/M2 | TEMPERATURE: 98.2 F | DIASTOLIC BLOOD PRESSURE: 74 MMHG | HEIGHT: 68 IN | HEART RATE: 107 BPM | WEIGHT: 165 LBS | OXYGEN SATURATION: 98 % | SYSTOLIC BLOOD PRESSURE: 110 MMHG

## 2023-10-20 DIAGNOSIS — M54.50 CHRONIC BILATERAL LOW BACK PAIN WITHOUT SCIATICA: Primary | ICD-10-CM

## 2023-10-20 DIAGNOSIS — G89.29 CHRONIC BILATERAL LOW BACK PAIN WITHOUT SCIATICA: Primary | ICD-10-CM

## 2023-10-20 PROCEDURE — 99213 OFFICE O/P EST LOW 20 MIN: CPT | Performed by: FAMILY MEDICINE

## 2023-10-20 RX ORDER — PREDNISONE 20 MG/1
20 TABLET ORAL DAILY
Qty: 4 TABLET | Refills: 0 | Status: SHIPPED | OUTPATIENT
Start: 2023-10-20 | End: 2023-10-24

## 2023-10-20 RX ORDER — BACLOFEN 10 MG/1
10 TABLET ORAL 3 TIMES DAILY
Qty: 30 TABLET | Refills: 0 | Status: SHIPPED | OUTPATIENT
Start: 2023-10-20

## 2023-10-20 RX ORDER — TRAMADOL HYDROCHLORIDE 50 MG/1
50 TABLET ORAL EVERY 12 HOURS PRN
Qty: 14 TABLET | Refills: 0 | Status: SHIPPED | OUTPATIENT
Start: 2023-10-20

## 2023-10-20 NOTE — PROGRESS NOTES
"Subjective   Yaa Walls is a 63 y.o. female.     History of Present Illness     Yaa Walls is a 63-year-old female who presents today with complaints of back pain. She consents to the use of ANTHONY.    The patient reports that she was diagnosed with Graves' disease.     She thought about doing physical therapy, and she has the paperwork for it but did not get around to it. The patient thought she would get it done just before she comes to see me in 02/2024 because it said she had a year, \"but I didn't think I'd be hurting like this.\" She notes that she tries to do some of the exercises, but she worries that she is doing the exercises incorrectly and may be hurting her back. Her muscles are spasming in the middle and on both sides of her back. It is hard for her to sit up for a long time, and when she gets up, she feels like it is going to grab her again and push her back down.     The patient denies any trauma. She denies any pain going down her legs. She denies any numbness. The patient can control her bladder, but it is hard to wipe after going. Laying down makes it feel better. It makes it harder to walk, to get dressed, and bend. She denies any falls. She has been taking meloxicam and drinking a lot of water. She denies dysuria or other urinary symptoms. The pain got this bad about 4 days ago. She rates the pain as a 10 out of 10.     The patient had to leave work early because of the pain on 10/23/2023, and on 10/14/2023. She is scheduled to work again tomorrow. She reports that she was told to get [facility] paperwork completed. She states that she has been going through this every 3 months.    She is a dispatcher.    The following portions of the patient's history were reviewed and updated as appropriate: allergies, current medications, past family history, past medical history, past social history, past surgical history, and problem list.  Past Medical History:   Diagnosis Date    Allergic     Arthritis     " Fibroids     Hypertension     Hyperthyroidism     Low back pain      Past Surgical History:   Procedure Laterality Date    COLONOSCOPY  Sept    Could I use the [place]    HYSTERECTOMY      With BSO     OOPHORECTOMY       Family History   Problem Relation Age of Onset    Osteoporosis Mother     Arthritis Mother             COPD Mother     Diabetes Father     Hypertension Father     Arthritis Father              Social History     Socioeconomic History    Marital status: Single    Number of children: 0    Years of education: 13   Tobacco Use    Smoking status: Some Days     Packs/day: 0.00     Years: 0.00     Additional pack years: 0.00     Total pack years: 0.00     Types: Cigarettes     Passive exposure: Yes    Smokeless tobacco: Never   Vaping Use    Vaping Use: Never used   Substance and Sexual Activity    Alcohol use: Yes     Alcohol/week: 2.0 standard drinks of alcohol     Types: 2 Glasses of wine per week     Comment: social    Drug use: No    Sexual activity: Yes     Partners: Male     Birth control/protection: None         Current Outpatient Medications:     cetirizine (zyrTEC) 10 MG tablet, Take 1 tablet by mouth Daily., Disp: 90 tablet, Rfl: 1    estradiol (ESTRACE) 2 MG tablet, Take 1 tablet by mouth once daily, Disp: 90 tablet, Rfl: 0    fluticasone (FLONASE) 50 MCG/ACT nasal spray, 2 sprays into the nostril(s) as directed by provider Daily., Disp: 9.9 mL, Rfl: 1    lisinopril-hydrochlorothiazide (PRINZIDE,ZESTORETIC) 20-12.5 MG per tablet, Take 2 tablets by mouth once daily, Disp: 180 tablet, Rfl: 3    meloxicam (Mobic) 15 MG tablet, Take 1 tablet by mouth Daily., Disp: 90 tablet, Rfl: 1    methIMAzole (TAPAZOLE) 10 MG tablet, Take 1 tablet by mouth Daily for 120 days., Disp: 30 tablet, Rfl: 3    baclofen (LIORESAL) 10 MG tablet, Take 1 tablet by mouth 3 (Three) Times a Day., Disp: 30 tablet, Rfl: 0    traMADol (ULTRAM) 50 MG tablet, Take 1 tablet by mouth Every 12 (Twelve) Hours As  "Needed for Moderate Pain., Disp: 14 tablet, Rfl: 0    Review of Systems  Review of systems was performed, and pertinent findings are noted in the HPI.    /74 (BP Location: Left arm, Patient Position: Sitting, Cuff Size: Large Adult)   Pulse 107   Temp 98.2 °F (36.8 °C) (Temporal)   Ht 172.7 cm (68\")   Wt 74.8 kg (165 lb)   SpO2 98%   BMI 25.09 kg/m²           Objective   Physical Exam  Vitals reviewed.   Constitutional:       Appearance: She is normal weight.   Musculoskeletal:      Comments: No vertebral tenderness.  Mild paralumbar spasm   Neurological:      Mental Status: She is alert.      Comments: LE strength / DTRs are normal           Assessment & Plan   Problems Addressed this Visit    None  Visit Diagnoses       Chronic bilateral low back pain without sciatica    -  Primary    Relevant Medications    traMADol (ULTRAM) 50 MG tablet          Diagnoses         Codes Comments    Chronic bilateral low back pain without sciatica    -  Primary ICD-10-CM: M54.50, G89.29  ICD-9-CM: 724.2, 338.29           1. Chronic back pain  - Follow up with physical therapy as prescribed.  - Continue meloxicam.  - I will prescribe a muscle relaxant.  - I will prescribe a steroid for a few days.  - I will prescribe tramadol.  - I will put her out of work tomorrow.    Transcribed from ambient dictation for Crystal Guillen MD by Sherri Mansfield.  10/24/23   08:34 EDT    Patient or patient representative verbalized consent to the visit recording.  I have personally performed the services described in this document as transcribed by the above individual, and it is both accurate and complete.           "

## 2023-10-20 NOTE — LETTER
October 20, 2023     Patient: Yaa Walls   YOB: 1960   Date of Visit: 10/20/2023       To Whom It May Concern:    It is my medical opinion that Yaa Walls may return to work on Tuesday, October 24,2023         Sincerely,        Crystal Guillen MD    CC: No Recipients

## 2023-10-20 NOTE — E-VISIT ESCALATED
Chief Complaint: Low back pain   Patient was shown the following escalation message:   Some conditions need immediate, in-person medical attention   Because you have loss of strength in both of your legs, you should get care right now.   ----------   Patient Interview Transcript:   Where on your lower back do you feel pain? This interview treats low back pain only. Select one.    Both sides   Not selected:    Right side    Left side    Along the spine or bony part of my back   Since your back pain started, have you had any of these stomach- or bladder-related symptoms? Select all that apply.    Pelvic or lower abdominal pain   Not selected:    Abdominal pain or discomfort    Nausea    Vomiting    Pain that's worse after eating    Burning with urination    Frequent urination    Blood in your urine    None of these   Do any of these statements apply to you? Select all that apply.    None of the above   Not selected:    I've been taking oral steroids such as prednisone for a long time    I have a bruise or scrape on my spine where the pain is    I have osteoporosis   How long have you had your current episode of back pain? Select one.    More than 6 weeks   Not selected:    Less than 1 week    1 to 2 weeks    3 to 4 weeks    5 to 6 weeks   What does the pain feel like? Select one.    Other (please describe): Can't bend over Can't hardly stand up   Not selected:    Dull or aching    Sharp, shooting, stabbing, or burning   How severe is your back pain? Think about how the pain affects your everyday activities. On a scale of 1 to 10, for example, how difficult is it to get out of bed, get dressed, shower, or go to work or school? Select one.    Severe, 7 to 9; my pain is distressing, and it limits me from doing some everyday activities   Not selected:    Mild, 1 to 3; my pain is noticeable and distracting, but I am still able to do everyday activities    Moderate, 4 to 6; my pain is strong, and it makes everyday  activities uncomfortable    Unbearable, 10+; my pain is so intense that it keeps me from doing almost all everyday activities   Does the pain travel down from your lower back to your buttock, thigh, lower leg, or foot? Select one.    Yes, it travels down both sides   Not selected:    Yes, it travels down my right side    Yes, it travels down my left side    No   Which figure best matches the pattern of your pain? Your pain may or may not travel all the way down to the foot. Choose the figure that best matches the areas where you're having pain.    Figure 1   Not selected:    Figure 2    Figure 3    None of these   Since your back pain started, have you had numbness in the areas shown on these figures? Your numbness may or may not travel all the way down to the foot. Choose the figure that best matches the area where you're having numbness.    I don't have any numbness   Not selected:    Figure 1    Figure 2    Figure 3    My numbness doesn't match any of these   Does your back pain get worse at night or when you're lying down? Select one.    No   Not selected:    Yes   Do any of these make your back pain worse? Select all that apply.    Sitting in one position for a long time    Being on my feet for a long time    Bending over    Standing up from a bent over position    Coughing or sneezing   Not selected:    Physical activity or exercise    Twisting to the side    Leaning to the side    None of the above   Do any of these help your back feel better? Select all that apply.    Resting or lying down    Frequently changing positions   Not selected:    Physical activity or exercise    Stretching    None of the above   In the last 2 weeks, have you experienced any of these? Select all that apply.    Dressed more slowly than usual because of back pain    Walked only short distances because of back pain    Experienced pain in the shoulder or neck area   Not selected:    None of these   Since your back pain started, have  you stumbled or fallen because of problems with balance or coordination? Select one.    No   Not selected:    Yes    I have an underlying condition that prevents me from standing or walking    I have an underlying condition that causes problems with my balance or coordination   Along with your back pain, have you noticed a loss of strength in your legs? Select one.    No   Not selected:    Yes, but only in one leg    Yes, in both legs    I have limited or no strength in my legs because of a chronic underlying condition   Since your back pain started, have you had any of these symptoms? Back pain doesn't usually come with other symptoms that affect your whole body. Select all that apply.    None of these   Not selected:    Unexplained fever    Severe fatigue that doesn't improve with rest    Unintentional weight loss    Drenching night sweats   Since your back pain began, have you noticed any loss of bowel or bladder function? This includes urinating or having a bowel movement when you didn't mean to. It also includes feeling like you can't urinate or empty your bladder all the way. Select one.    No   Not selected:    Yes   In the last 2 weeks, have you had any of these thoughts or feelings related to your low back pain? Select all that apply.    Felt little enjoyment in activities you usually enjoy    [place] that your back pain affects your mood and can limit your activities   Not selected:    Had worrying thoughts    [place] that your back pain is terrible and will never get any better    Thought that it's unsafe to be physically active    None of these   Can you squat down and then rise to a standing position?    No, both my legs are too weak   Not selected:    Yes, with ease    Yes, but my right leg seems weaker than my left leg    Yes, but my left leg seems weaker than my right leg    No, my right leg is too weak    No, my left leg is too weak   Is this something you're normally able to do? Select one.    Yes   Not  selected:    No   ----------   Medical history   Medical history data does not currently exist for this patient.

## 2023-11-13 ENCOUNTER — LAB (OUTPATIENT)
Dept: LAB | Facility: HOSPITAL | Age: 63
End: 2023-11-13
Payer: COMMERCIAL

## 2023-11-13 DIAGNOSIS — E05.90 HYPERTHYROIDISM: ICD-10-CM

## 2023-11-13 LAB
T4 FREE SERPL-MCNC: 0.62 NG/DL (ref 0.93–1.7)
TSH SERPL DL<=0.05 MIU/L-ACNC: 0.04 UIU/ML (ref 0.27–4.2)

## 2023-11-13 PROCEDURE — 84443 ASSAY THYROID STIM HORMONE: CPT

## 2023-11-13 PROCEDURE — 36415 COLL VENOUS BLD VENIPUNCTURE: CPT

## 2023-11-13 PROCEDURE — 84439 ASSAY OF FREE THYROXINE: CPT

## 2023-11-20 ENCOUNTER — OFFICE VISIT (OUTPATIENT)
Dept: ENDOCRINOLOGY | Facility: CLINIC | Age: 63
End: 2023-11-20
Payer: COMMERCIAL

## 2023-11-20 VITALS
BODY MASS INDEX: 26.98 KG/M2 | SYSTOLIC BLOOD PRESSURE: 122 MMHG | OXYGEN SATURATION: 98 % | WEIGHT: 178 LBS | HEART RATE: 71 BPM | HEIGHT: 68 IN | DIASTOLIC BLOOD PRESSURE: 72 MMHG

## 2023-11-20 DIAGNOSIS — E78.2 MIXED HYPERLIPIDEMIA: ICD-10-CM

## 2023-11-20 DIAGNOSIS — E66.3 OVERWEIGHT: ICD-10-CM

## 2023-11-20 DIAGNOSIS — I10 PRIMARY HYPERTENSION: ICD-10-CM

## 2023-11-20 DIAGNOSIS — E05.00 GRAVES' DISEASE: Primary | ICD-10-CM

## 2023-11-20 DIAGNOSIS — H05.20 EXOPHTHALMOS: ICD-10-CM

## 2023-11-20 PROCEDURE — 99214 OFFICE O/P EST MOD 30 MIN: CPT | Performed by: INTERNAL MEDICINE

## 2023-11-20 RX ORDER — METHIMAZOLE 5 MG/1
5 TABLET ORAL DAILY
Qty: 30 TABLET | Refills: 3 | Status: SHIPPED | OUTPATIENT
Start: 2023-11-20 | End: 2024-03-19

## 2023-11-20 NOTE — PATIENT INSTRUCTIONS
Please,    - Methimazole 5 mg 1 pill by mouth daily.  Since you already have 10 mg pills at home you can continue taking half pill by mouth daily.  - Repeat thyroid function back again in 6 weeks.    Follow-up in my clinic back again in 3 months with repeat blood work prior to the visit as well.    Thank you for your visit today.    If you have any questions or concerns please feel free to reach out of the office.

## 2023-11-20 NOTE — PROGRESS NOTES
-----------------------------------------------------------------  ENDOCRINE CLINIC NOTE  -----------------------------------------------------------------        PATIENT NAME: Yaa Walls  PATIENT : 1960 AGE: 63 y.o.  MRN NUMBER: [id number]  PRIMARY CARE: Crystal Guillen MD    ==========================================================================    CHIEF COMPLAINT: Hyperthyroidism  DATE OF SERVICE: 23    ==========================================================================    HPI / SUBJECTIVE    63 y.o. female is seen in the clinic today for follow up of hyperthyroidism.  Last visit 2023.  Patient has been experiencing symptoms from 2023 which started with progressive vision change.  Patient went to see an ophthalmologist and was found to have exophthalmos.  Further work-up showed suppressed TSH and normal free T4 and T3 levels suggesting hyperthyroidism.  Patient referred to endocrinology clinic for evaluation.  Patient currently methimazole therapy, 5 mgs, adjusted few days back.  Symptoms at the time of diagnosis including vision changes and hot intolerance.  No other family history of thyroid disorder.  No history of neck surgery or radiation exposure.  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  Patient maintained on hormone replacement therapy since hysterectomy at age 41.    ==========================================================================                                                PAST MEDICAL HISTORY    Past Medical History:   Diagnosis Date    Allergic     Arthritis     Encounter for general adult medical examination without abnormal findings 2011    Fibroids     Graves disease     Hypertension     Hyperthyroidism     Hypothyroidism     Low back pain        ==========================================================================    PAST SURGICAL HISTORY    Past Surgical  History:   Procedure Laterality Date    COLONOSCOPY  Sept    Could I use the [place]    HYSTERECTOMY      With BSO     OOPHORECTOMY         ==========================================================================    FAMILY HISTORY    Family History   Problem Relation Age of Onset    Osteoporosis Mother     Arthritis Mother             COPD Mother     Diabetes Father     Hypertension Father     Arthritis Father                ==========================================================================    SOCIAL HISTORY    Social History     Socioeconomic History    Marital status: Single    Number of children: 0    Years of education: 13   Tobacco Use    Smoking status: Never     Passive exposure: Yes    Smokeless tobacco: Never   Vaping Use    Vaping Use: Never used   Substance and Sexual Activity    Alcohol use: Yes     Alcohol/week: 3.0 standard drinks of alcohol     Types: 3 Cans of beer per week     Comment: social    Drug use: No    Sexual activity: Yes     Partners: Male     Birth control/protection: None, Hysterectomy       ==========================================================================    MEDICATIONS      Current Outpatient Medications:     cetirizine (zyrTEC) 10 MG tablet, Take 1 tablet by mouth Daily., Disp: 90 tablet, Rfl: 1    estradiol (ESTRACE) 2 MG tablet, Take 1 tablet by mouth once daily, Disp: 90 tablet, Rfl: 0    fluticasone (FLONASE) 50 MCG/ACT nasal spray, 2 sprays into the nostril(s) as directed by provider Daily., Disp: 9.9 mL, Rfl: 1    lisinopril-hydrochlorothiazide (PRINZIDE,ZESTORETIC) 20-12.5 MG per tablet, Take 2 tablets by mouth once daily, Disp: 180 tablet, Rfl: 3    meloxicam (Mobic) 15 MG tablet, Take 1 tablet by mouth Daily., Disp: 90 tablet, Rfl: 1    methIMAzole (TAPAZOLE) 5 MG tablet, Take 1 tablet by mouth Daily for 120 days., Disp: 30 tablet, Rfl: 3    ==========================================================================    ALLERGIES    No  "Known Allergies    ==========================================================================    OBJECTIVE    Vitals:    11/20/23 0943   BP: 122/72   Pulse: 71   SpO2: 98%     Body mass index is 27.06 kg/m².     General: Alert, cooperative, no acute distress  Thyroid:  no enlargement/tenderness/palpable nodules  Lungs: Clear to auscultation bilaterally, respirations unlabored  Heart: Regular rate and rhythm, S1 and S2 normal, no murmur, rub or gallop  Abdomen: Soft, NT, ND and Bowel sounds Positive  Extremities:  Extremities normal, atraumatic, no cyanosis or edema    ==========================================================================    LAB EVALUATION    Lab Results   Component Value Date    GLUCOSE 64 (L) 02/15/2022    BUN 10 02/15/2022    CREATININE 0.81 02/15/2022    EGFRIFAFRI 87 02/15/2022    BCR 12.3 02/15/2022    K 4.5 02/15/2022    CO2 28.6 02/15/2022    CALCIUM 9.6 02/15/2022    ALBUMIN 4.10 02/15/2022    LABIL2 1.1 02/13/2018    AST 10 02/15/2022    ALT 11 02/15/2022    CHOL 203 (H) 02/15/2022    TRIG 109 02/15/2022    HDL 63 (H) 02/15/2022     (H) 02/15/2022     No results found for: \"HGBA1C\"  Lab Results   Component Value Date    CREATININE 0.81 02/15/2022     Lab Results   Component Value Date    TSH 0.039 (L) 11/13/2023    FREET4 0.62 (L) 11/13/2023      Latest Reference Range & Units 09/11/23 11:25 11/13/23 08:22   TSH Baseline 0.270 - 4.200 uIU/mL <0.005 (L) 0.039 (L)   Free T4 0.93 - 1.70 ng/dL 1.76 (H) 0.62 (L)   Thyroid Stimulating Immunoglobulin 0.00 - 0.55 IU/L 3.13 (H)    (L): Data is abnormally low  (H): Data is abnormally high    ==========================================================================    ASSESSMENT AND PLAN    Assessment:  #Hyperthyroidism due to Graves' disease  #Exophthalmos, follow with ophthalmology and can take natural tears  #Hyperlipidemia  #Hypertension  #Overweight with BMI of 27.06    Plan:  -Discussed with patient again in detail about " "pathophysiology of thyroid gland and thyroid hormone and association of TSH with free T4  - There is improvement in TSH but there is significant decrease in free T4 levels on blood work on 11/13/2023  - Patient was taking methimazole 10 mg 1 pill by mouth daily  - Decrease methimazole to 5 mg p.o. daily  - Discussed with patient that she will most likely require more de-escalation of methimazole therapy  - Given patient history of Graves' disease there is a potential for remission as well and 18 months time  - We will plan for repeat thyroid profile back again in 6 weeks and in 3 months time  - Patient to follow-up in my clinic back again in 3 months time    Return to clinic: 3 months    Entire assessment and plan was discussed and counseled the patient in detail to which patient verbalized understanding and agreed with care.  Answered all queries and concerns.    This note was created using voice recognition software and is inherently subject to errors including those of syntax and \"sound-alike\" substitutions which may escape proofreading.  In such instances, original meaning may be extrapolated by contextual derivation.    Note: Portions of this note may have been copied from previous notes but documentation have been reviewed and edited as necessary to support clinical decision making for today's visit.    ==========================================================================    INFORMATION PROVIDED TO PATIENT    Patient Instructions   Please,    - Methimazole 5 mg 1 pill by mouth daily.  Since you already have 10 mg pills at home you can continue taking half pill by mouth daily.  - Repeat thyroid function back again in 6 weeks.    Follow-up in my clinic back again in 3 months with repeat blood work prior to the visit as well.    Thank you for your visit today.    If you have any questions or concerns please feel free to reach out of the office. "       ==========================================================================  Julian Mullins MD  Department of Endocrine, Diabetes and Metabolism  [facility]  [place], [place]  ==========================================================================

## 2023-12-26 RX ORDER — METHIMAZOLE 5 MG/1
5 TABLET ORAL DAILY
Qty: 30 TABLET | Refills: 3 | Status: SHIPPED | OUTPATIENT
Start: 2023-12-26 | End: 2024-04-24

## 2024-01-08 ENCOUNTER — LAB (OUTPATIENT)
Dept: LAB | Facility: HOSPITAL | Age: 64
End: 2024-01-08
Payer: COMMERCIAL

## 2024-01-08 DIAGNOSIS — I10 HYPERTENSION, UNSPECIFIED TYPE: ICD-10-CM

## 2024-01-08 DIAGNOSIS — E05.00 GRAVES' DISEASE: Primary | ICD-10-CM

## 2024-01-08 LAB
ALBUMIN SERPL-MCNC: 4.5 G/DL (ref 3.5–5.2)
ALBUMIN/GLOB SERPL: 1.8 G/DL
ALP SERPL-CCNC: 67 U/L (ref 39–117)
ALT SERPL W P-5'-P-CCNC: 12 U/L (ref 1–33)
ANION GAP SERPL CALCULATED.3IONS-SCNC: 12 MMOL/L (ref 5–15)
AST SERPL-CCNC: 12 U/L (ref 1–32)
BILIRUB SERPL-MCNC: 0.4 MG/DL (ref 0–1.2)
BUN SERPL-MCNC: 17 MG/DL (ref 8–23)
BUN/CREAT SERPL: 14.4 (ref 7–25)
CALCIUM SPEC-SCNC: 9.1 MG/DL (ref 8.6–10.5)
CHLORIDE SERPL-SCNC: 100 MMOL/L (ref 98–107)
CO2 SERPL-SCNC: 26 MMOL/L (ref 22–29)
CREAT SERPL-MCNC: 1.18 MG/DL (ref 0.57–1)
EGFRCR SERPLBLD CKD-EPI 2021: 51.7 ML/MIN/1.73
GLOBULIN UR ELPH-MCNC: 2.5 GM/DL
GLUCOSE SERPL-MCNC: 98 MG/DL (ref 65–99)
POTASSIUM SERPL-SCNC: 3.7 MMOL/L (ref 3.5–5.2)
PROT SERPL-MCNC: 7 G/DL (ref 6–8.5)
SODIUM SERPL-SCNC: 138 MMOL/L (ref 136–145)
T4 FREE SERPL-MCNC: 0.68 NG/DL (ref 0.93–1.7)
TSH SERPL DL<=0.05 MIU/L-ACNC: 2.01 UIU/ML (ref 0.27–4.2)

## 2024-01-08 PROCEDURE — 36415 COLL VENOUS BLD VENIPUNCTURE: CPT

## 2024-01-08 PROCEDURE — 80053 COMPREHEN METABOLIC PANEL: CPT

## 2024-01-08 PROCEDURE — 84439 ASSAY OF FREE THYROXINE: CPT

## 2024-01-08 PROCEDURE — 84443 ASSAY THYROID STIM HORMONE: CPT

## 2024-01-10 DIAGNOSIS — J30.9 ALLERGIC RHINITIS, UNSPECIFIED SEASONALITY, UNSPECIFIED TRIGGER: ICD-10-CM

## 2024-01-10 RX ORDER — CETIRIZINE HYDROCHLORIDE 10 MG/1
10 TABLET, FILM COATED ORAL DAILY
Qty: 90 TABLET | Refills: 0 | Status: SHIPPED | OUTPATIENT
Start: 2024-01-10

## 2024-01-16 DIAGNOSIS — Z12.31 ENCOUNTER FOR SCREENING MAMMOGRAM FOR MALIGNANT NEOPLASM OF BREAST: Primary | ICD-10-CM

## 2024-01-16 DIAGNOSIS — N95.1 HOT FLASHES DUE TO MENOPAUSE: ICD-10-CM

## 2024-01-17 RX ORDER — ESTRADIOL 2 MG/1
TABLET ORAL
Qty: 90 TABLET | Refills: 0 | Status: SHIPPED | OUTPATIENT
Start: 2024-01-17

## 2024-01-17 NOTE — TELEPHONE ENCOUNTER
I refilled her estradiol  She is overdue a mammogram  I put the order in the computer so she can get that scheduled

## 2024-02-12 ENCOUNTER — LAB (OUTPATIENT)
Dept: LAB | Facility: HOSPITAL | Age: 64
End: 2024-02-12
Payer: COMMERCIAL

## 2024-02-12 DIAGNOSIS — E05.00 GRAVES' DISEASE: ICD-10-CM

## 2024-02-12 LAB
T4 FREE SERPL-MCNC: 0.87 NG/DL (ref 0.93–1.7)
TSH SERPL DL<=0.05 MIU/L-ACNC: 1.94 UIU/ML (ref 0.27–4.2)

## 2024-02-12 PROCEDURE — 36415 COLL VENOUS BLD VENIPUNCTURE: CPT

## 2024-02-12 PROCEDURE — 84443 ASSAY THYROID STIM HORMONE: CPT

## 2024-02-12 PROCEDURE — 84439 ASSAY OF FREE THYROXINE: CPT

## 2024-02-19 ENCOUNTER — OFFICE VISIT (OUTPATIENT)
Dept: ENDOCRINOLOGY | Facility: CLINIC | Age: 64
End: 2024-02-19
Payer: COMMERCIAL

## 2024-02-19 VITALS
HEIGHT: 68 IN | OXYGEN SATURATION: 99 % | BODY MASS INDEX: 26.98 KG/M2 | DIASTOLIC BLOOD PRESSURE: 78 MMHG | WEIGHT: 178 LBS | HEART RATE: 99 BPM | SYSTOLIC BLOOD PRESSURE: 120 MMHG

## 2024-02-19 DIAGNOSIS — E66.3 OVERWEIGHT: ICD-10-CM

## 2024-02-19 DIAGNOSIS — I10 PRIMARY HYPERTENSION: ICD-10-CM

## 2024-02-19 DIAGNOSIS — H05.20 EXOPHTHALMOS: ICD-10-CM

## 2024-02-19 DIAGNOSIS — E05.00 GRAVES' DISEASE: Primary | ICD-10-CM

## 2024-02-19 DIAGNOSIS — E78.2 MIXED HYPERLIPIDEMIA: ICD-10-CM

## 2024-02-19 PROCEDURE — 99214 OFFICE O/P EST MOD 30 MIN: CPT | Performed by: INTERNAL MEDICINE

## 2024-02-19 RX ORDER — METHIMAZOLE 5 MG/1
5 TABLET ORAL EVERY OTHER DAY
Qty: 18 TABLET | Refills: 2 | Status: SHIPPED | OUTPATIENT
Start: 2024-02-19 | End: 2024-06-06

## 2024-02-19 NOTE — PROGRESS NOTES
-----------------------------------------------------------------  ENDOCRINE CLINIC NOTE  -----------------------------------------------------------------        PATIENT NAME: Yaa Walls  PATIENT : 1960 AGE: 63 y.o.  MRN NUMBER: [id number]  PRIMARY CARE: Crystal Guillen MD    ==========================================================================    CHIEF COMPLAINT: Hyperthyroidism  DATE OF SERVICE: 24    ==========================================================================    HPI / SUBJECTIVE    63 y.o. female is seen in the clinic today for follow up of hyperthyroidism.  Symptomatic since 2023.  Initially she went to the ophthalmologist and was found to have exophthalmos with workup showing suppressed TSH and normal free T3 and T4 suggesting hyperthyroidism.  Patient currently taking methimazole 2.5 mg once a day.  No other family history of thyroid disorder.  No history of neck surgery or radiation exposure.  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  Patient maintained on hormone replacement therapy since hysterectomy at age 41.    ==========================================================================                                                PAST MEDICAL HISTORY    Past Medical History:   Diagnosis Date    Allergic     Arthritis     Encounter for general adult medical examination without abnormal findings 2011    Fibroids     Graves disease     Hypertension     Hyperthyroidism     Hypothyroidism     Low back pain        ==========================================================================    PAST SURGICAL HISTORY    Past Surgical History:   Procedure Laterality Date    COLONOSCOPY  Sept    Could I use the [place]    HYSTERECTOMY      With BSO     OOPHORECTOMY         ==========================================================================    FAMILY HISTORY    Family History    Problem Relation Age of Onset    Osteoporosis Mother     Arthritis Mother             COPD Mother     Diabetes Father     Hypertension Father     Arthritis Father                ==========================================================================    SOCIAL HISTORY    Social History     Socioeconomic History    Marital status: Single    Number of children: 0    Years of education: 13   Tobacco Use    Smoking status: Some Days     Packs/day: 0.25     Years: 15.00     Additional pack years: 0.00     Total pack years: 3.75     Types: Cigarettes     Passive exposure: Yes    Smokeless tobacco: Never   Vaping Use    Vaping Use: Never used   Substance and Sexual Activity    Alcohol use: Yes     Alcohol/week: 3.0 standard drinks of alcohol     Types: 3 Cans of beer per week     Comment: social    Drug use: No    Sexual activity: Yes     Partners: Male     Birth control/protection: None, Hysterectomy       ==========================================================================    MEDICATIONS      Current Outpatient Medications:     EQ Allergy Relief, Cetirizine, 10 MG tablet, Take 1 tablet by mouth once daily, Disp: 90 tablet, Rfl: 0    estradiol (ESTRACE) 2 MG tablet, Take 1 tablet by mouth once daily, Disp: 90 tablet, Rfl: 0    fluticasone (FLONASE) 50 MCG/ACT nasal spray, 2 sprays into the nostril(s) as directed by provider Daily., Disp: 9.9 mL, Rfl: 1    lisinopril-hydrochlorothiazide (PRINZIDE,ZESTORETIC) 20-12.5 MG per tablet, Take 2 tablets by mouth once daily, Disp: 180 tablet, Rfl: 3    meloxicam (Mobic) 15 MG tablet, Take 1 tablet by mouth Daily., Disp: 90 tablet, Rfl: 1    methIMAzole (TAPAZOLE) 5 MG tablet, Take 1 tablet by mouth Every Other Day for 108 days., Disp: 18 tablet, Rfl: 2    ==========================================================================    ALLERGIES    No Known  "Allergies    ==========================================================================    OBJECTIVE    Vitals:    02/19/24 0949   BP: 120/78   Pulse: 99   SpO2: 99%     Body mass index is 27.06 kg/m².     General: Alert, cooperative, no acute distress  Thyroid:  no enlargement/tenderness/palpable nodules  Lungs: Clear to auscultation bilaterally, respirations unlabored  Heart: Regular rate and rhythm, S1 and S2 normal, no murmur, rub or gallop  Abdomen: Soft, NT, ND and Bowel sounds Positive  Extremities:  Extremities normal, atraumatic, no cyanosis or edema    ==========================================================================    LAB EVALUATION    Lab Results   Component Value Date    GLUCOSE 98 01/08/2024    BUN 17 01/08/2024    CREATININE 1.18 (H) 01/08/2024    EGFRIFAFRI 87 02/15/2022    BCR 14.4 01/08/2024    K 3.7 01/08/2024    CO2 26.0 01/08/2024    CALCIUM 9.1 01/08/2024    ALBUMIN 4.5 01/08/2024    LABIL2 1.1 02/13/2018    AST 12 01/08/2024    ALT 12 01/08/2024    CHOL 203 (H) 02/15/2022    TRIG 109 02/15/2022    HDL 63 (H) 02/15/2022     (H) 02/15/2022     No results found for: \"HGBA1C\"  Lab Results   Component Value Date    CREATININE 1.18 (H) 01/08/2024     Lab Results   Component Value Date    TSH 1.940 02/12/2024    FREET4 0.87 (L) 02/12/2024      Latest Reference Range & Units 09/11/23 11:25   Thyroid Stimulating Immunoglobulin 0.00 - 0.55 IU/L 3.13 (H)   (H): Data is abnormally high    ==========================================================================    ASSESSMENT AND PLAN    #Hyperthyroidism due to Graves' disease  #Exophthalmos, follow with ophthalmology  #Hyperlipidemia  #Hypertension  #Overweight with BMI of 27.06    Plan:  -There is a persistent improvement in thyroid function and now free T4 is on the lower end while TSH is normal  - Currently taking methimazole 2.5 mg (half pill of 5 mg) once a day  - No significant symptoms  - Will decrease the dose of methimazole to 5 " "mg twice a week  - Repeat thyroid function back again in 6 weeks in 3 months time  - Patient most likely is going into remission will need closer monitoring  - Patient to follow-up in my clinic back again in 3 months time    Return to clinic: 3 months    Entire assessment and plan was discussed and counseled the patient in detail to which patient verbalized understanding and agreed with care.  Answered all queries and concerns.    This note was created using voice recognition software and is inherently subject to errors including those of syntax and \"sound-alike\" substitutions which may escape proofreading.  In such instances, original meaning may be extrapolated by contextual derivation.    Note: Portions of this note may have been copied from previous notes but documentation have been reviewed and edited as necessary to support clinical decision making for today's visit.    ==========================================================================    INFORMATION PROVIDED TO PATIENT    Patient Instructions   Please,    - Methimazole 5 mg 1 pill by mouth twice a week around Monday and Thursday  - Repeat thyroid function back again in 6 weeks.    Follow-up in my clinic back again in 3 months with repeat blood work prior to the visit as well.    Thank you for your visit today.    If you have any questions or concerns please feel free to reach out of the office.       ==========================================================================  Julian Mullins MD  Department of Endocrine, Diabetes and Metabolism  [facility] [place], [place]  ==========================================================================  "

## 2024-02-19 NOTE — PATIENT INSTRUCTIONS
Please,    - Methimazole 5 mg 1 pill by mouth twice a week around Monday and Thursday  - Repeat thyroid function back again in 6 weeks.    Follow-up in my clinic back again in 3 months with repeat blood work prior to the visit as well.    Thank you for your visit today.    If you have any questions or concerns please feel free to reach out of the office.

## 2024-03-27 ENCOUNTER — HOSPITAL ENCOUNTER (OUTPATIENT)
Dept: MAMMOGRAPHY | Facility: HOSPITAL | Age: 64
Discharge: HOME | End: 2024-03-27
Admitting: FAMILY MEDICINE
Payer: COMMERCIAL

## 2024-03-27 ENCOUNTER — OFFICE VISIT (OUTPATIENT)
Dept: FAMILY MEDICINE CLINIC | Facility: CLINIC | Age: 64
End: 2024-03-27
Payer: COMMERCIAL

## 2024-03-27 VITALS
HEIGHT: 68 IN | DIASTOLIC BLOOD PRESSURE: 86 MMHG | HEART RATE: 81 BPM | BODY MASS INDEX: 26.67 KG/M2 | TEMPERATURE: 97.6 F | WEIGHT: 176 LBS | OXYGEN SATURATION: 99 % | SYSTOLIC BLOOD PRESSURE: 131 MMHG

## 2024-03-27 DIAGNOSIS — R73.9 HYPERGLYCEMIA: ICD-10-CM

## 2024-03-27 DIAGNOSIS — N95.1 HOT FLASHES DUE TO MENOPAUSE: ICD-10-CM

## 2024-03-27 DIAGNOSIS — Z12.31 ENCOUNTER FOR SCREENING MAMMOGRAM FOR MALIGNANT NEOPLASM OF BREAST: ICD-10-CM

## 2024-03-27 DIAGNOSIS — E78.5 HYPERLIPIDEMIA, UNSPECIFIED HYPERLIPIDEMIA TYPE: ICD-10-CM

## 2024-03-27 DIAGNOSIS — I10 PRIMARY HYPERTENSION: Primary | ICD-10-CM

## 2024-03-27 DIAGNOSIS — J30.9 ALLERGIC RHINITIS, UNSPECIFIED SEASONALITY, UNSPECIFIED TRIGGER: ICD-10-CM

## 2024-03-27 PROCEDURE — 77067 SCR MAMMO BI INCL CAD: CPT

## 2024-03-27 PROCEDURE — 77063 BREAST TOMOSYNTHESIS BI: CPT

## 2024-03-27 PROCEDURE — 99214 OFFICE O/P EST MOD 30 MIN: CPT | Performed by: FAMILY MEDICINE

## 2024-03-27 RX ORDER — ESTRADIOL 1 MG/1
1 TABLET ORAL DAILY
Qty: 90 TABLET | Refills: 3 | Status: SHIPPED | OUTPATIENT
Start: 2024-03-27

## 2024-03-27 RX ORDER — CETIRIZINE HYDROCHLORIDE 10 MG/1
10 TABLET ORAL DAILY
Qty: 90 TABLET | Refills: 3 | Status: SHIPPED | OUTPATIENT
Start: 2024-03-27

## 2024-03-27 NOTE — PROGRESS NOTES
Subjective   Yaa Walls is a 63 y.o. female.     History of Present Illness     The patient is a 63-year-old female who comes in for follow-up on her blood pressure, cholesterol, and blood sugar.    She denies any chest pain. She would like to get a 90-day prescription of cetirizine. She needs a refill of estradiol and wonders how long she should use it.    The following portions of the patient's history were reviewed and updated as appropriate: allergies, current medications, past family history, past medical history, past social history, past surgical history, and problem list.  Past Medical History:   Diagnosis Date    Allergic     Arthritis     Encounter for general adult medical examination without abnormal findings 2011    Fibroids     Graves disease     Hypertension     Hyperthyroidism     Hypothyroidism     Low back pain      Past Surgical History:   Procedure Laterality Date    COLONOSCOPY  Sept    Could I use the [place]    HYSTERECTOMY      With BSO     OOPHORECTOMY       Family History   Problem Relation Age of Onset    Osteoporosis Mother     Arthritis Mother             COPD Mother     Diabetes Father     Hypertension Father     Arthritis Father             Asthma Sister              Social History     Socioeconomic History    Marital status: Single    Number of children: 0    Years of education: 13   Tobacco Use    Smoking status: Some Days     Current packs/day: 0.25     Average packs/day: 0.3 packs/day for 15.0 years (3.8 ttl pk-yrs)     Types: Cigarettes     Passive exposure: Yes    Smokeless tobacco: Never   Vaping Use    Vaping status: Never Used   Substance and Sexual Activity    Alcohol use: Yes     Alcohol/week: 3.0 standard drinks of alcohol     Types: 3 Cans of beer per week     Comment: social    Drug use: No    Sexual activity: Yes     Partners: Male     Birth control/protection: None, Hysterectomy         Current Outpatient Medications:     cetirizine  "(EQ Allergy Relief, Cetirizine,) 10 MG tablet, Take 1 tablet by mouth Daily., Disp: 90 tablet, Rfl: 3    estradiol (ESTRACE) 1 MG tablet, Take 1 tablet by mouth Daily., Disp: 90 tablet, Rfl: 3    fluticasone (FLONASE) 50 MCG/ACT nasal spray, 2 sprays into the nostril(s) as directed by provider Daily., Disp: 9.9 mL, Rfl: 1    lisinopril-hydrochlorothiazide (PRINZIDE,ZESTORETIC) 20-12.5 MG per tablet, Take 2 tablets by mouth once daily, Disp: 180 tablet, Rfl: 3    meloxicam (Mobic) 15 MG tablet, Take 1 tablet by mouth Daily., Disp: 90 tablet, Rfl: 1    methIMAzole (TAPAZOLE) 5 MG tablet, Take 1 tablet by mouth Every Other Day for 108 days., Disp: 18 tablet, Rfl: 2    Review of Systems  /86 (BP Location: Left arm, Patient Position: Sitting, Cuff Size: Large Adult)   Pulse 81   Temp 97.6 °F (36.4 °C) (Temporal)   Ht 172.7 cm (68\")   Wt 79.8 kg (176 lb)   SpO2 99%   BMI 26.76 kg/m²    A review of systems was performed, and the pertinent positives are noted in the HPI.         Objective   Physical Exam  Vitals and nursing note reviewed.   Constitutional:       Appearance: Normal appearance. She is well-developed, well-groomed and overweight.   Cardiovascular:      Rate and Rhythm: Regular rhythm.   Pulmonary:      Breath sounds: Normal breath sounds.   Musculoskeletal:      Right lower leg: No edema.      Left lower leg: No edema.   Neurological:      Mental Status: She is alert.   Psychiatric:         Mood and Affect: Mood normal.       Lab Results   Component Value Date    GLUCOSE 98 01/08/2024    BUN 17 01/08/2024    CREATININE 1.18 (H) 01/08/2024    EGFR 51.7 (L) 01/08/2024    BCR 14.4 01/08/2024    K 3.7 01/08/2024    CO2 26.0 01/08/2024    CALCIUM 9.1 01/08/2024    ALBUMIN 4.5 01/08/2024    BILITOT 0.4 01/08/2024    AST 12 01/08/2024    ALT 12 01/08/2024           Assessment & Plan   Problems Addressed this Visit          Cardiac and Vasculature    Hypertension - Primary    Relevant Orders    Lipid Panel "    Hyperlipidemia    Relevant Orders    Lipid Panel       Endocrine and Metabolic    Hyperglycemia    Relevant Orders    Hemoglobin A1c     Other Visit Diagnoses       Hot flashes due to menopause        UTD on mammogram    Relevant Medications    estradiol (ESTRACE) 1 MG tablet    Allergic rhinitis, unspecified seasonality, unspecified trigger        Relevant Medications    cetirizine (EQ Allergy Relief, Cetirizine,) 10 MG tablet          Diagnoses         Codes Comments    Primary hypertension    -  Primary ICD-10-CM: I10  ICD-9-CM: 401.9     Hyperlipidemia, unspecified hyperlipidemia type     ICD-10-CM: E78.5  ICD-9-CM: 272.4     Hyperglycemia     ICD-10-CM: R73.9  ICD-9-CM: 790.29     Hot flashes due to menopause     ICD-10-CM: N95.1  ICD-9-CM: 627.2 UTD on mammogram    Allergic rhinitis, unspecified seasonality, unspecified trigger     ICD-10-CM: J30.9  ICD-9-CM: 477.9             1. Hypertension.  Recent CMP was reviewed. She will continue her lisinopril and hydrochlorothiazide.    2. Hyperlipidemia.  She is due a lipid panel.    3. Hyperglycemia.  She is due an A1c. She was congratulated on the weight loss that she has had thus far.    4. Hot flashes due to menopause.  She wants to wean down on her estradiol, so I decreased her from 2 mg to 1 mg. She just had her mammogram done this morning.    5. Allergic rhinitis.  She needs a refill on her Zyrtec.    Follow-up  The patient will follow up in 6 months for her physical.       Answers submitted by the patient for this visit:  Other (Submitted on 3/27/2024)  Please describe your symptoms.: Follow up  Have you had these symptoms before?: Yes  How long have you been having these symptoms?: Greater than 2 weeks  Please list any medications you are currently taking for this condition.: *  Please describe any probable cause for these symptoms. : *  Primary Reason for Visit (Submitted on 3/27/2024)  What is the primary reason for your visit?: Other    Transcribed  from ambient dictation for Crystal Guillen MD by Mary Brown.  03/27/24   11:50 EDT    Patient or patient representative verbalized consent to the visit recording.  I have personally performed the services described in this document as transcribed by the above individual, and it is both accurate and complete.

## 2024-04-01 ENCOUNTER — LAB (OUTPATIENT)
Dept: LAB | Facility: HOSPITAL | Age: 64
End: 2024-04-01
Payer: COMMERCIAL

## 2024-04-01 DIAGNOSIS — E05.00 GRAVES' DISEASE: ICD-10-CM

## 2024-04-01 LAB
T4 FREE SERPL-MCNC: 0.98 NG/DL (ref 0.93–1.7)
TSH SERPL DL<=0.05 MIU/L-ACNC: 0.84 UIU/ML (ref 0.27–4.2)

## 2024-04-01 PROCEDURE — 84443 ASSAY THYROID STIM HORMONE: CPT

## 2024-04-01 PROCEDURE — 84439 ASSAY OF FREE THYROXINE: CPT

## 2024-04-01 PROCEDURE — 36415 COLL VENOUS BLD VENIPUNCTURE: CPT

## 2024-04-30 ENCOUNTER — OFFICE VISIT (OUTPATIENT)
Dept: FAMILY MEDICINE CLINIC | Facility: CLINIC | Age: 64
End: 2024-04-30
Payer: COMMERCIAL

## 2024-04-30 VITALS
OXYGEN SATURATION: 95 % | BODY MASS INDEX: 26.52 KG/M2 | TEMPERATURE: 98 F | HEIGHT: 68 IN | SYSTOLIC BLOOD PRESSURE: 116 MMHG | WEIGHT: 175 LBS | DIASTOLIC BLOOD PRESSURE: 78 MMHG | HEART RATE: 80 BPM

## 2024-04-30 DIAGNOSIS — H10.32 ACUTE CONJUNCTIVITIS OF LEFT EYE, UNSPECIFIED ACUTE CONJUNCTIVITIS TYPE: ICD-10-CM

## 2024-04-30 DIAGNOSIS — H00.015 HORDEOLUM EXTERNUM OF LEFT LOWER EYELID: Primary | ICD-10-CM

## 2024-04-30 PROCEDURE — 99213 OFFICE O/P EST LOW 20 MIN: CPT | Performed by: FAMILY MEDICINE

## 2024-04-30 RX ORDER — ERYTHROMYCIN 5 MG/G
OINTMENT OPHTHALMIC
COMMUNITY
Start: 2024-04-28

## 2024-04-30 RX ORDER — OFLOXACIN 3 MG/ML
1 SOLUTION/ DROPS OPHTHALMIC 4 TIMES DAILY
Qty: 5 ML | Refills: 0 | Status: SHIPPED | OUTPATIENT
Start: 2024-04-30

## 2024-04-30 NOTE — PROGRESS NOTES
Vito Walls is a 63 y.o. female.     History of Present Illness  The patient comes in today with complaints of eye swelling and itching.    The patient began experiencing symptoms in her left eye last Friday. She sought medical attention at the [facility] on Saturday where she was diagnosed with an infection and prescribed erythromycin ointment, which unfortunately, did not alleviate her symptoms. She reports that the condition has progressively enlarged, impairing her vision.       The following portions of the patient's history were reviewed and updated as appropriate: allergies, current medications, past family history, past medical history, past social history, past surgical history, and problem list.  Past Medical History:   Diagnosis Date    Allergic     Arthritis     Encounter for general adult medical examination without abnormal findings 2011    Fibroids     Graves disease     Hypertension     Hyperthyroidism     Hypothyroidism     Low back pain      Past Surgical History:   Procedure Laterality Date    COLONOSCOPY  Sept    Could I use the Colongard    HYSTERECTOMY      With BSO     OOPHORECTOMY       Family History   Problem Relation Age of Onset    Osteoporosis Mother     Arthritis Mother             COPD Mother     Diabetes Father     Hypertension Father     Arthritis Father             Asthma Sister              Social History     Socioeconomic History    Marital status: Single    Number of children: 0    Years of education: 13   Tobacco Use    Smoking status: Some Days     Current packs/day: 0.25     Average packs/day: 0.3 packs/day for 15.3 years (3.8 ttl pk-yrs)     Types: Cigarettes     Start date:      Passive exposure: Yes    Smokeless tobacco: Never   Vaping Use    Vaping status: Never Used   Substance and Sexual Activity    Alcohol use: Yes     Alcohol/week: 3.0 standard drinks of alcohol     Types: 3 Cans of beer per week     Comment: social  "   Drug use: No    Sexual activity: Yes     Partners: Male     Birth control/protection: None, Hysterectomy         Current Outpatient Medications:     cetirizine (EQ Allergy Relief, Cetirizine,) 10 MG tablet, Take 1 tablet by mouth Daily., Disp: 90 tablet, Rfl: 3    erythromycin (ROMYCIN) 5 MG/GM ophthalmic ointment, APPLY TO THE LOWER EYELID OF AFFECTED EYE 4 TIMES A DAY FOR 7 DAYS, Disp: , Rfl:     estradiol (ESTRACE) 1 MG tablet, Take 1 tablet by mouth Daily., Disp: 90 tablet, Rfl: 3    fluticasone (FLONASE) 50 MCG/ACT nasal spray, 2 sprays into the nostril(s) as directed by provider Daily., Disp: 9.9 mL, Rfl: 1    lisinopril-hydrochlorothiazide (PRINZIDE,ZESTORETIC) 20-12.5 MG per tablet, Take 2 tablets by mouth once daily, Disp: 180 tablet, Rfl: 3    meloxicam (Mobic) 15 MG tablet, Take 1 tablet by mouth Daily., Disp: 90 tablet, Rfl: 1    methIMAzole (TAPAZOLE) 5 MG tablet, Take 1 tablet by mouth Every Other Day for 108 days., Disp: 18 tablet, Rfl: 2    ofloxacin (Ocuflox) 0.3 % ophthalmic solution, Administer 1 drop into the left eye 4 (Four) Times a Day., Disp: 5 mL, Rfl: 0    Review of Systems  ROS done and noted in HPI    /78 (BP Location: Left arm, Patient Position: Sitting, Cuff Size: Large Adult)   Pulse 80   Temp 98 °F (36.7 °C) (Temporal)   Ht 172.7 cm (68\")   Wt 79.4 kg (175 lb)   SpO2 95%   BMI 26.61 kg/m²           Objective   Physical Exam  Vitals and nursing note reviewed.   Eyes:      General:         Left eye: Hordeolum present.     Conjunctiva/sclera:      Left eye: Left conjunctiva is injected.        Comments: Swelling of left lower lid   Neurological:      Mental Status: She is alert.       Physical Exam  Infection noted in the left eye.       Results         Assessment & Plan   Problems Addressed this Visit    None  Visit Diagnoses       Hordeolum externum of left lower eyelid    -  Primary    Acute conjunctivitis of left eye, unspecified acute conjunctivitis type        " Relevant Medications    erythromycin (ROMYCIN) 5 MG/GM ophthalmic ointment    ofloxacin (Ocuflox) 0.3 % ophthalmic solution          Diagnoses         Codes Comments    Hordeolum externum of left lower eyelid    -  Primary ICD-10-CM: H00.015  ICD-9-CM: 373.11     Acute conjunctivitis of left eye, unspecified acute conjunctivitis type     ICD-10-CM: H10.32  ICD-9-CM: 372.00           Assessment & Plan  1. Stye and conjunctivitis  The patient is advised to apply warm compresses to the affected eye and to apply the erythromycin ointment twice daily. Additionally, she is recommended to maintain good hand hygiene. She is strongly advised to schedule an appointment with her ophthalmologist.  Ocuflox added            Patient or patient representative verbalized consent for the use of Ambient Listening during the visit with  Crystal Guillen MD for chart documentation. 4/30/2024  13:11 EDT

## 2024-05-20 ENCOUNTER — LAB (OUTPATIENT)
Dept: LAB | Facility: HOSPITAL | Age: 64
End: 2024-05-20
Payer: COMMERCIAL

## 2024-05-20 DIAGNOSIS — E05.00 GRAVES' DISEASE: ICD-10-CM

## 2024-05-20 LAB
T4 FREE SERPL-MCNC: 0.86 NG/DL (ref 0.93–1.7)
TSH SERPL DL<=0.05 MIU/L-ACNC: 0.36 UIU/ML (ref 0.27–4.2)

## 2024-05-20 PROCEDURE — 84439 ASSAY OF FREE THYROXINE: CPT

## 2024-05-20 PROCEDURE — 36415 COLL VENOUS BLD VENIPUNCTURE: CPT

## 2024-05-20 PROCEDURE — 84443 ASSAY THYROID STIM HORMONE: CPT

## 2024-05-28 DIAGNOSIS — E05.00 GRAVES' DISEASE: ICD-10-CM

## 2024-05-30 RX ORDER — METHIMAZOLE 5 MG/1
5 TABLET ORAL EVERY OTHER DAY
Qty: 18 TABLET | Refills: 2 | Status: SHIPPED | OUTPATIENT
Start: 2024-05-30 | End: 2024-09-15

## 2024-06-03 ENCOUNTER — OFFICE VISIT (OUTPATIENT)
Dept: ENDOCRINOLOGY | Facility: CLINIC | Age: 64
End: 2024-06-03
Payer: COMMERCIAL

## 2024-06-03 VITALS
HEIGHT: 68 IN | DIASTOLIC BLOOD PRESSURE: 64 MMHG | OXYGEN SATURATION: 98 % | WEIGHT: 174 LBS | HEART RATE: 68 BPM | SYSTOLIC BLOOD PRESSURE: 116 MMHG | BODY MASS INDEX: 26.37 KG/M2

## 2024-06-03 DIAGNOSIS — I10 PRIMARY HYPERTENSION: ICD-10-CM

## 2024-06-03 DIAGNOSIS — E66.3 OVERWEIGHT: ICD-10-CM

## 2024-06-03 DIAGNOSIS — E05.00 GRAVES' DISEASE: Primary | ICD-10-CM

## 2024-06-03 DIAGNOSIS — E78.2 MIXED HYPERLIPIDEMIA: ICD-10-CM

## 2024-06-03 DIAGNOSIS — H05.20 EXOPHTHALMOS: ICD-10-CM

## 2024-06-03 PROCEDURE — 99214 OFFICE O/P EST MOD 30 MIN: CPT | Performed by: INTERNAL MEDICINE

## 2024-06-03 NOTE — PATIENT INSTRUCTIONS
Please,    - Change Methimazole to 2.5 mg (Half pill of 5 mgs) by mouth twice a week around Monday and Thursday  - Repeat thyroid function back again in 6 weeks.    Follow-up in my clinic back again in 3 months with repeat blood work prior to the visit as well.    Thank you for your visit today.    If you have any questions or concerns please feel free to reach out of the office.

## 2024-06-03 NOTE — PROGRESS NOTES
-----------------------------------------------------------------  ENDOCRINE CLINIC NOTE  -----------------------------------------------------------------        PATIENT NAME: Yaa Walls  PATIENT : 1960 AGE: 64 y.o.  MRN NUMBER: [id number]  PRIMARY CARE: Crystal Guillen MD    ==========================================================================    CHIEF COMPLAINT: Hyperthyroidism  DATE OF SERVICE: 24    ==========================================================================    HPI / SUBJECTIVE    64 y.o. female is seen in the clinic today for follow up of hyperthyroidism.  Patient currently taking methimazole 5 mg Monday and Thursday.  No other family history of thyroid disorder.  No history of neck surgery or radiation exposure.  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  Patient maintained on hormone replacement therapy since hysterectomy at age 41.  Last blood work on My 2024.    ==========================================================================                                                PAST MEDICAL HISTORY    Past Medical History:   Diagnosis Date    Allergic     Arthritis     Encounter for general adult medical examination without abnormal findings 2011    Fibroids     Graves disease     Hypertension     Hyperthyroidism     Hypothyroidism     Low back pain        ==========================================================================    PAST SURGICAL HISTORY    Past Surgical History:   Procedure Laterality Date    COLONOSCOPY  Sept    Could I use the Colongard    HYSTERECTOMY      With BSO     OOPHORECTOMY         ==========================================================================    FAMILY HISTORY    Family History   Problem Relation Age of Onset    Osteoporosis Mother     Arthritis Mother             COPD Mother     Diabetes Father     Hypertension Father      Arthritis Father             Asthma Sister                ==========================================================================    SOCIAL HISTORY    Social History     Socioeconomic History    Marital status: Single    Number of children: 0    Years of education: 13   Tobacco Use    Smoking status: Some Days     Current packs/day: 0.25     Average packs/day: 0.3 packs/day for 15.4 years (3.9 ttl pk-yrs)     Types: Cigarettes     Start date:      Passive exposure: Yes    Smokeless tobacco: Never   Vaping Use    Vaping status: Never Used   Substance and Sexual Activity    Alcohol use: Yes     Alcohol/week: 3.0 standard drinks of alcohol     Types: 3 Cans of beer per week     Comment: social    Drug use: No    Sexual activity: Yes     Partners: Male     Birth control/protection: None, Hysterectomy       ==========================================================================    MEDICATIONS      Current Outpatient Medications:     cetirizine (EQ Allergy Relief, Cetirizine,) 10 MG tablet, Take 1 tablet by mouth Daily., Disp: 90 tablet, Rfl: 3    erythromycin (ROMYCIN) 5 MG/GM ophthalmic ointment, APPLY TO THE LOWER EYELID OF AFFECTED EYE 4 TIMES A DAY FOR 7 DAYS, Disp: , Rfl:     estradiol (ESTRACE) 1 MG tablet, Take 1 tablet by mouth Daily., Disp: 90 tablet, Rfl: 3    fluticasone (FLONASE) 50 MCG/ACT nasal spray, 2 sprays into the nostril(s) as directed by provider Daily., Disp: 9.9 mL, Rfl: 1    lisinopril-hydrochlorothiazide (PRINZIDE,ZESTORETIC) 20-12.5 MG per tablet, Take 2 tablets by mouth once daily, Disp: 180 tablet, Rfl: 3    meloxicam (Mobic) 15 MG tablet, Take 1 tablet by mouth Daily., Disp: 90 tablet, Rfl: 1    methIMAzole (TAPAZOLE) 5 MG tablet, Take 1 tablet by mouth Every Other Day for 108 days., Disp: 18 tablet, Rfl: 2    ofloxacin (Ocuflox) 0.3 % ophthalmic solution, Administer 1 drop into the left eye 4 (Four) Times a Day., Disp: 5 mL, Rfl:  "0    ==========================================================================    ALLERGIES    No Known Allergies    ==========================================================================    OBJECTIVE    Vitals:    06/03/24 1014   BP: 116/64   Pulse: 68   SpO2: 98%     Body mass index is 26.46 kg/m².     General: Alert, cooperative, no acute distress  Thyroid:  no enlargement/tenderness/palpable nodules  Lungs: Clear to auscultation bilaterally, respirations unlabored  Heart: Regular rate and rhythm, S1 and S2 normal, no murmur, rub or gallop  Abdomen: Soft, NT, ND and Bowel sounds Positive  Extremities:  Extremities normal, atraumatic, no cyanosis or edema    ==========================================================================    LAB EVALUATION    Lab Results   Component Value Date    GLUCOSE 98 01/08/2024    BUN 17 01/08/2024    CREATININE 1.18 (H) 01/08/2024    EGFRIFAFRI 87 02/15/2022    BCR 14.4 01/08/2024    K 3.7 01/08/2024    CO2 26.0 01/08/2024    CALCIUM 9.1 01/08/2024    ALBUMIN 4.5 01/08/2024    LABIL2 1.1 02/13/2018    AST 12 01/08/2024    ALT 12 01/08/2024    CHOL 203 (H) 02/15/2022    TRIG 109 02/15/2022    HDL 63 (H) 02/15/2022     (H) 02/15/2022     No results found for: \"HGBA1C\"  Lab Results   Component Value Date    CREATININE 1.18 (H) 01/08/2024     Lab Results   Component Value Date    TSH 0.358 05/20/2024    FREET4 0.86 (L) 05/20/2024      Latest Reference Range & Units 09/11/23 11:25   Thyroid Stimulating Immunoglobulin 0.00 - 0.55 IU/L 3.13 (H)   (H): Data is abnormally high    ==========================================================================    ASSESSMENT AND PLAN    #Hyperthyroidism due to Graves' disease  #Exophthalmos, follow with ophthalmology  #Hyperlipidemia  #Hypertension  #Overweight with BMI of 27.06    Plan:  - Free T4 continues to improve and now on the lower side while TSH is also at baseline  - Will further de-escalate therapy of methimazole to 2.5 mg " "(half pill of 5 mg) twice a week  - Repeat thyroid function back again in 6 weeks in 3 months time  - Patient most likely will going to remission and therefore we will continue closer monitoring    Return to clinic: 3 months    Entire assessment and plan was discussed and counseled the patient in detail to which patient verbalized understanding and agreed with care.  Answered all queries and concerns.    This note was created using voice recognition software and is inherently subject to errors including those of syntax and \"sound-alike\" substitutions which may escape proofreading.  In such instances, original meaning may be extrapolated by contextual derivation.    Note: Portions of this note may have been copied from previous notes but documentation have been reviewed and edited as necessary to support clinical decision making for today's visit.    ==========================================================================    INFORMATION PROVIDED TO PATIENT    Patient Instructions   Please,    - Change Methimazole to 2.5 mg (Half pill of 5 mgs) by mouth twice a week around Monday and Thursday  - Repeat thyroid function back again in 6 weeks.    Follow-up in my clinic back again in 3 months with repeat blood work prior to the visit as well.    Thank you for your visit today.    If you have any questions or concerns please feel free to reach out of the office.       ==========================================================================  Julian Mullins MD  Department of Endocrine, Diabetes and Metabolism  [facility], [place]  ==========================================================================  "

## 2024-07-22 ENCOUNTER — LAB (OUTPATIENT)
Dept: LAB | Facility: HOSPITAL | Age: 64
End: 2024-07-22
Payer: COMMERCIAL

## 2024-07-22 DIAGNOSIS — E05.00 GRAVES' DISEASE: ICD-10-CM

## 2024-07-22 LAB
T4 FREE SERPL-MCNC: 0.96 NG/DL (ref 0.93–1.7)
TSH SERPL DL<=0.05 MIU/L-ACNC: 0.36 UIU/ML (ref 0.27–4.2)

## 2024-07-22 PROCEDURE — 36415 COLL VENOUS BLD VENIPUNCTURE: CPT

## 2024-07-22 PROCEDURE — 84443 ASSAY THYROID STIM HORMONE: CPT

## 2024-07-22 PROCEDURE — 84439 ASSAY OF FREE THYROXINE: CPT

## 2024-09-22 DIAGNOSIS — I10 ESSENTIAL HYPERTENSION: ICD-10-CM

## 2024-09-23 RX ORDER — LISINOPRIL AND HYDROCHLOROTHIAZIDE 12.5; 2 MG/1; MG/1
TABLET ORAL
Qty: 180 TABLET | Refills: 0 | Status: SHIPPED | OUTPATIENT
Start: 2024-09-23

## 2024-12-02 ENCOUNTER — LAB (OUTPATIENT)
Dept: LAB | Facility: HOSPITAL | Age: 64
End: 2024-12-02
Payer: COMMERCIAL

## 2024-12-02 DIAGNOSIS — E05.00 GRAVES' DISEASE: ICD-10-CM

## 2024-12-02 LAB
T4 FREE SERPL-MCNC: 1.74 NG/DL (ref 0.93–1.7)
TSH SERPL DL<=0.05 MIU/L-ACNC: <0.005 UIU/ML (ref 0.27–4.2)

## 2024-12-02 PROCEDURE — 84439 ASSAY OF FREE THYROXINE: CPT

## 2024-12-02 PROCEDURE — 36415 COLL VENOUS BLD VENIPUNCTURE: CPT

## 2024-12-02 PROCEDURE — 84443 ASSAY THYROID STIM HORMONE: CPT

## 2024-12-13 DIAGNOSIS — I10 ESSENTIAL HYPERTENSION: ICD-10-CM

## 2024-12-13 RX ORDER — LISINOPRIL AND HYDROCHLOROTHIAZIDE 12.5; 2 MG/1; MG/1
TABLET ORAL
Qty: 180 TABLET | Refills: 0 | Status: SHIPPED | OUTPATIENT
Start: 2024-12-13

## 2024-12-16 DIAGNOSIS — E05.00 GRAVES' DISEASE: ICD-10-CM

## 2024-12-16 RX ORDER — METHIMAZOLE 5 MG/1
5 TABLET ORAL EVERY OTHER DAY
Qty: 45 TABLET | Refills: 1 | Status: SHIPPED | OUTPATIENT
Start: 2024-12-16 | End: 2025-06-14

## 2024-12-17 ENCOUNTER — TELEPHONE (OUTPATIENT)
Dept: FAMILY MEDICINE CLINIC | Facility: CLINIC | Age: 64
End: 2024-12-17

## 2024-12-17 NOTE — TELEPHONE ENCOUNTER
Caller: Yaa Walls    Relationship to patient: Self    Best call back number: [phone]    Chief complaint: NONE, NEEDS  A PHYSICAL    Type of visit: PHYSICAL    Requested date: ANYTIME IN MARCH

## 2025-02-05 ENCOUNTER — OFFICE VISIT (OUTPATIENT)
Dept: FAMILY MEDICINE CLINIC | Facility: CLINIC | Age: 65
End: 2025-02-05
Payer: COMMERCIAL

## 2025-02-05 VITALS
BODY MASS INDEX: 26.4 KG/M2 | OXYGEN SATURATION: 99 % | HEART RATE: 72 BPM | SYSTOLIC BLOOD PRESSURE: 150 MMHG | TEMPERATURE: 97.3 F | HEIGHT: 68 IN | WEIGHT: 174.2 LBS | DIASTOLIC BLOOD PRESSURE: 58 MMHG

## 2025-02-05 DIAGNOSIS — I10 ESSENTIAL HYPERTENSION: ICD-10-CM

## 2025-02-05 DIAGNOSIS — J30.9 ALLERGIC RHINITIS, UNSPECIFIED SEASONALITY, UNSPECIFIED TRIGGER: ICD-10-CM

## 2025-02-05 DIAGNOSIS — Z12.11 SCREENING FOR COLON CANCER: ICD-10-CM

## 2025-02-05 DIAGNOSIS — N39.46 MIXED STRESS AND URGE URINARY INCONTINENCE: Primary | ICD-10-CM

## 2025-02-05 DIAGNOSIS — N95.1 HOT FLASHES DUE TO MENOPAUSE: ICD-10-CM

## 2025-02-05 DIAGNOSIS — R19.5 POSITIVE COLORECTAL CANCER SCREENING USING COLOGUARD TEST: ICD-10-CM

## 2025-02-05 LAB
BILIRUB BLD-MCNC: NEGATIVE MG/DL
CLARITY, POC: CLEAR
COLOR UR: YELLOW
EXPIRATION DATE: NORMAL
GLUCOSE UR STRIP-MCNC: NEGATIVE MG/DL
KETONES UR QL: NEGATIVE
LEUKOCYTE EST, POC: NEGATIVE
Lab: NORMAL
NITRITE UR-MCNC: NEGATIVE MG/ML
PH UR: 6 [PH] (ref 5–8)
PROT UR STRIP-MCNC: NEGATIVE MG/DL
RBC # UR STRIP: NEGATIVE /UL
SP GR UR: 1 (ref 1–1.03)
UROBILINOGEN UR QL: NORMAL

## 2025-02-05 PROCEDURE — 81003 URINALYSIS AUTO W/O SCOPE: CPT | Performed by: FAMILY MEDICINE

## 2025-02-05 PROCEDURE — 99214 OFFICE O/P EST MOD 30 MIN: CPT | Performed by: FAMILY MEDICINE

## 2025-02-05 RX ORDER — ESTRADIOL 1 MG/1
1 TABLET ORAL DAILY
Qty: 90 TABLET | Refills: 3 | Status: SHIPPED | OUTPATIENT
Start: 2025-02-05

## 2025-02-05 RX ORDER — OXYBUTYNIN CHLORIDE 10 MG/1
10 TABLET, EXTENDED RELEASE ORAL DAILY
Qty: 90 TABLET | Refills: 3 | Status: SHIPPED | OUTPATIENT
Start: 2025-02-05

## 2025-02-05 RX ORDER — FLUTICASONE PROPIONATE 50 MCG
2 SPRAY, SUSPENSION (ML) NASAL DAILY
Qty: 48 G | Refills: 3 | Status: SHIPPED | OUTPATIENT
Start: 2025-02-05

## 2025-02-05 RX ORDER — LISINOPRIL AND HYDROCHLOROTHIAZIDE 12.5; 2 MG/1; MG/1
2 TABLET ORAL DAILY
Qty: 180 TABLET | Refills: 3 | Status: SHIPPED | OUTPATIENT
Start: 2025-02-05

## 2025-02-05 RX ORDER — CETIRIZINE HYDROCHLORIDE 10 MG/1
10 TABLET ORAL DAILY
Qty: 90 TABLET | Refills: 3 | Status: SHIPPED | OUTPATIENT
Start: 2025-02-05

## 2025-02-05 NOTE — PROGRESS NOTES
Answers submitted by the patient for this visit:  Problem not listed (Submitted on 2/3/2025)  Chief Complaint: Other medical problem  anorexia: No  joint pain: No  change in stool: No  headaches: No  joint swelling: No  vertigo: No  visual change: No  Other symptom: Can't stop my bladder every time I call for blow my nose and everything I tried cabling but it's still not working it's so embarrassing  Onset: 1 to 5 years  Chronicity: recurrent  Frequency: constantly  Medications tried: Nothing over the counter kegling everyday  Additional information: I'm sweating a lot I think it's because we reduced my estradiol to 1 mg it might need to go back up to 2mg Dr Gilmore wanted to bring it to you intention but it was miserable  Subjective   Yaa Walls is a 64 y.o. female.     History of Present Illness  The patient is a 64-year-old female who presents for evaluation of urinary incontinence and excessive sweating.    She reports an exacerbation of her sweating symptoms following the reduction of her estradiol dosage from 2 mg to 1 mg. She expresses concern about the potential increased risk of breast cancer associated with estrogen therapy. She is seeking a 90-day refill of her medications due to impending changes in her insurance coverage. She is not experiencing any chest pain.    She describes severe urinary incontinence, to the extent that she is unable to sneeze, cough, or breathe without urinating. Despite daily Kegel exercises, she has not observed any improvement in her symptoms. She also reports a new onset of urinary urgency, which was previously absent. She is interested in trying medication for her symptoms but is concerned about potential side effects.    She has received a positive Cologuard test result and is scheduled for a colonoscopy. However, she expresses apprehension about undergoing anesthesia.    Supplemental Information  She is on antibiotics for her toe infection.    MEDICATIONS  Current:  Premarin (estradiol)       The following portions of the patient's history were reviewed and updated as appropriate: allergies, current medications, past family history, past medical history, past social history, past surgical history, and problem list.  Past Medical History:   Diagnosis Date    Allergic     Arthritis     Encounter for general adult medical examination without abnormal findings 2011    Fibroids     Graves disease     Hypertension     Hyperthyroidism     Hypothyroidism     Low back pain      Past Surgical History:   Procedure Laterality Date    COLONOSCOPY  Sept    Could I use the Colongard    HYSTERECTOMY      With BSO     OOPHORECTOMY       Family History   Problem Relation Age of Onset    Osteoporosis Mother     Arthritis Mother             COPD Mother     Diabetes Father     Hypertension Father     Arthritis Father             Asthma Sister              Social History     Socioeconomic History    Marital status: Single    Number of children: 0    Years of education: 13   Tobacco Use    Smoking status: Some Days     Current packs/day: 0.25     Average packs/day: 0.3 packs/day for 16.2 years (4.0 ttl pk-yrs)     Types: Cigarettes     Start date: 2009     Passive exposure: Yes    Smokeless tobacco: Never   Vaping Use    Vaping status: Never Used   Substance and Sexual Activity    Alcohol use: Yes     Alcohol/week: 3.0 standard drinks of alcohol     Types: 3 Cans of beer per week     Comment: social    Drug use: No    Sexual activity: Yes     Partners: Male     Birth control/protection: None, Hysterectomy         Current Outpatient Medications:     cetirizine (EQ Allergy Relief, Cetirizine,) 10 MG tablet, Take 1 tablet by mouth Daily., Disp: 90 tablet, Rfl: 3    erythromycin (ROMYCIN) 5 MG/GM ophthalmic ointment, APPLY TO THE LOWER EYELID OF AFFECTED EYE 4 TIMES A DAY FOR 7 DAYS, Disp: , Rfl:     estradiol (ESTRACE) 1 MG tablet, Take 1 tablet by mouth Daily., Disp:  "90 tablet, Rfl: 3    fluticasone (FLONASE) 50 MCG/ACT nasal spray, Administer 2 sprays into the nostril(s) as directed by provider Daily., Disp: 48 g, Rfl: 3    lisinopril-hydrochlorothiazide (PRINZIDE,ZESTORETIC) 20-12.5 MG per tablet, Take 2 tablets by mouth Daily., Disp: 180 tablet, Rfl: 3    meloxicam (Mobic) 15 MG tablet, Take 1 tablet by mouth Daily., Disp: 90 tablet, Rfl: 1    methIMAzole (TAPAZOLE) 5 MG tablet, Take 1 tablet by mouth Every Other Day for 180 days., Disp: 45 tablet, Rfl: 1    ofloxacin (Ocuflox) 0.3 % ophthalmic solution, Administer 1 drop into the left eye 4 (Four) Times a Day., Disp: 5 mL, Rfl: 0    oxybutynin XL (Ditropan XL) 10 MG 24 hr tablet, Take 1 tablet by mouth Daily. For bloadder, Disp: 90 tablet, Rfl: 3    Review of Systems  ROS done and noted in HPI    /58 (BP Location: Left arm, Patient Position: Sitting, Cuff Size: Large Adult)   Pulse 72   Temp 97.3 °F (36.3 °C) (Temporal)   Ht 172.7 cm (68\")   Wt 79 kg (174 lb 3.2 oz)   SpO2 99%   BMI 26.49 kg/m²   BMI is >= 25 and <30. (Overweight) The following options were offered after discussion;: exercise counseling/recommendations       Objective   Physical Exam  Vitals and nursing note reviewed.   Constitutional:       Appearance: Normal appearance. She is well-developed, well-groomed and overweight.   Cardiovascular:      Rate and Rhythm: Normal rate.      Heart sounds: Normal heart sounds.   Pulmonary:      Effort: Pulmonary effort is normal.      Breath sounds: Normal breath sounds.   Musculoskeletal:      Cervical back: Neck supple.      Right lower leg: No edema.      Left lower leg: No edema.   Lymphadenopathy:      Cervical: No cervical adenopathy.   Neurological:      Mental Status: She is alert.   Psychiatric:         Behavior: Behavior is cooperative.       Physical Exam  Lungs are clear.       Results  Laboratory Studies  Thyroid is overactive. Urine test shows no infection. Cologuard test is positive.     Lab " Results   Component Value Date    TSH <0.005 (L) 12/02/2024     Brief Urine Lab Results       None                Assessment & Plan   Problems Addressed this Visit    None  Visit Diagnoses       Mixed stress and urge urinary incontinence    -  Primary    Relevant Medications    oxybutynin XL (Ditropan XL) 10 MG 24 hr tablet    Allergic rhinitis, unspecified seasonality, unspecified trigger        Relevant Medications    cetirizine (EQ Allergy Relief, Cetirizine,) 10 MG tablet    Essential hypertension        encouraged continued weight loss    Relevant Medications    lisinopril-hydrochlorothiazide (PRINZIDE,ZESTORETIC) 20-12.5 MG per tablet    Hot flashes due to menopause        UTD on mammogram    Relevant Medications    estradiol (ESTRACE) 1 MG tablet    Positive colorectal cancer screening using Cologuard test        Relevant Orders    Ambulatory Referral For Screening Colonoscopy (Completed)    Screening for colon cancer        Relevant Orders    Ambulatory Referral For Screening Colonoscopy (Completed)          Diagnoses         Codes Comments    Mixed stress and urge urinary incontinence    -  Primary ICD-10-CM: N39.46  ICD-9-CM: 788.33     Allergic rhinitis, unspecified seasonality, unspecified trigger     ICD-10-CM: J30.9  ICD-9-CM: 477.9     Essential hypertension     ICD-10-CM: I10  ICD-9-CM: 401.9 encouraged continued weight loss    Hot flashes due to menopause     ICD-10-CM: N95.1  ICD-9-CM: 627.2 UTD on mammogram    Positive colorectal cancer screening using Cologuard test     ICD-10-CM: R19.5  ICD-9-CM: 787.7     Screening for colon cancer     ICD-10-CM: Z12.11  ICD-9-CM: V76.51           Assessment & Plan  1. Urinary incontinence.  Her urine test results are within normal limits A prescription for ditropan XL 10mg to manage her urinary incontinence will be provided. The potential side effect of dry mouth has been discussed. If symptoms persist, a referral to a urologist will be considered. She did not  want the referral at this time    2. Excessive sweating.  Her estradiol dosage was recently reduced from 2 mg to 1 mg. She has been informed that prolonged use of estrogen increases the risk of breast cancer. Her thyroid function tests indicate hyperthyroidism, which could be contributing to her excessive sweating.    3. Positive Cologuard test.  A colonoscopy will be scheduled to further investigate the positive Cologuard test result. She has been advised to arrange for someone to accompany her home post-procedure due to the effects of anesthesia.     Refills for several of her medications for other conditions incl allergies and htn were sent to pharmacy       Patient or patient representative verbalized consent for the use of Ambient Listening during the visit with  Crystal Guillen MD for chart documentation. 2/5/2025  14:43 EST

## 2025-02-11 ENCOUNTER — OFFICE VISIT (OUTPATIENT)
Dept: ENDOCRINOLOGY | Facility: CLINIC | Age: 65
End: 2025-02-11
Payer: COMMERCIAL

## 2025-02-11 VITALS
BODY MASS INDEX: 25.91 KG/M2 | HEART RATE: 69 BPM | DIASTOLIC BLOOD PRESSURE: 66 MMHG | WEIGHT: 171 LBS | SYSTOLIC BLOOD PRESSURE: 114 MMHG | OXYGEN SATURATION: 100 % | HEIGHT: 68 IN

## 2025-02-11 DIAGNOSIS — E78.2 MIXED HYPERLIPIDEMIA: ICD-10-CM

## 2025-02-11 DIAGNOSIS — I10 PRIMARY HYPERTENSION: ICD-10-CM

## 2025-02-11 DIAGNOSIS — E05.00 GRAVES' DISEASE: Primary | ICD-10-CM

## 2025-02-11 DIAGNOSIS — E66.3 OVERWEIGHT: ICD-10-CM

## 2025-02-11 DIAGNOSIS — H05.20 EXOPHTHALMOS: ICD-10-CM

## 2025-02-11 PROCEDURE — 99214 OFFICE O/P EST MOD 30 MIN: CPT | Performed by: INTERNAL MEDICINE

## 2025-02-11 NOTE — PATIENT INSTRUCTIONS
Please,    - Continue methimazole 5 mg twice a week.  - Plan for repeat blood work today, repeat blood work in 6 weeks and in 3 months time    Follow-up in my clinic back again in 3 months.    Thank you for your visit today.    If you have any questions or concerns please feel free to reach out of the office.

## 2025-02-11 NOTE — PROGRESS NOTES
-----------------------------------------------------------------  ENDOCRINE CLINIC NOTE  -----------------------------------------------------------------        PATIENT NAME: Yaa Walls  PATIENT : 1960 AGE: 64 y.o.  MRN NUMBER: [id number]  PRIMARY CARE: Crystal Guillen MD    ==========================================================================    CHIEF COMPLAINT: Hyperthyroidism  DATE OF SERVICE: 25    ==========================================================================    HPI / SUBJECTIVE    64 y.o. female is seen in the clinic today for follow up of hyperthyroidism secondary to Graves' disease  Patient is currently taking methimazole 5 mg twice a week, Monday and Thursday.  No family history of thyroid disorder.  Patient last blood work was in 2024.  Patient therapy was de-escalated to methimazole 2.5 mg twice a week but blood work in December showed worsening hyperthyroidism and therefore methimazole therapy was up escalated to 5 mg twice a week.  No repeat blood work since 2024.    ==========================================================================                                                PAST MEDICAL HISTORY    Past Medical History:   Diagnosis Date    Allergic     Arthritis     Encounter for general adult medical examination without abnormal findings 2011    Fibroids     Graves disease     Hypertension     Hyperthyroidism     Hypothyroidism     Low back pain        ==========================================================================    PAST SURGICAL HISTORY    Past Surgical History:   Procedure Laterality Date    COLONOSCOPY  Sept    Could I use the Colongard    HYSTERECTOMY      With BSO     OOPHORECTOMY         ==========================================================================    FAMILY HISTORY    Family History   Problem Relation Age of Onset    Osteoporosis Mother     Arthritis Mother             COPD  Mother     Diabetes Father     Hypertension Father     Arthritis Father             Asthma Sister                ==========================================================================    SOCIAL HISTORY    Social History     Socioeconomic History    Marital status: Single    Number of children: 0    Years of education: 13   Tobacco Use    Smoking status: Some Days     Current packs/day: 0.00     Average packs/day: 0.3 packs/day for 16.1 years (4.0 ttl pk-yrs)     Types: Cigarettes     Start date: 2009     Last attempt to quit: 2025     Years since quittin.0     Passive exposure: Yes    Smokeless tobacco: Never   Vaping Use    Vaping status: Never Used   Substance and Sexual Activity    Alcohol use: Yes     Alcohol/week: 3.0 standard drinks of alcohol     Types: 3 Cans of beer per week     Comment: social    Drug use: No    Sexual activity: Yes     Partners: Male     Birth control/protection: None, Hysterectomy       ==========================================================================    MEDICATIONS      Current Outpatient Medications:     cetirizine (EQ Allergy Relief, Cetirizine,) 10 MG tablet, Take 1 tablet by mouth Daily., Disp: 90 tablet, Rfl: 3    erythromycin (ROMYCIN) 5 MG/GM ophthalmic ointment, APPLY TO THE LOWER EYELID OF AFFECTED EYE 4 TIMES A DAY FOR 7 DAYS, Disp: , Rfl:     estradiol (ESTRACE) 1 MG tablet, Take 1 tablet by mouth Daily., Disp: 90 tablet, Rfl: 3    fluticasone (FLONASE) 50 MCG/ACT nasal spray, Administer 2 sprays into the nostril(s) as directed by provider Daily., Disp: 48 g, Rfl: 3    lisinopril-hydrochlorothiazide (PRINZIDE,ZESTORETIC) 20-12.5 MG per tablet, Take 2 tablets by mouth Daily., Disp: 180 tablet, Rfl: 3    meloxicam (Mobic) 15 MG tablet, Take 1 tablet by mouth Daily., Disp: 90 tablet, Rfl: 1    methIMAzole (TAPAZOLE) 5 MG tablet, Take 1 tablet by mouth Every Other Day for 180 days., Disp: 45 tablet, Rfl: 1    ofloxacin (Ocuflox) 0.3 %  "ophthalmic solution, Administer 1 drop into the left eye 4 (Four) Times a Day., Disp: 5 mL, Rfl: 0    oxybutynin XL (Ditropan XL) 10 MG 24 hr tablet, Take 1 tablet by mouth Daily. For bloadder, Disp: 90 tablet, Rfl: 3    ==========================================================================    ALLERGIES    No Known Allergies    ==========================================================================    OBJECTIVE    Vitals:    02/11/25 1201   BP: 114/66   Pulse: 69   SpO2: 100%     Body mass index is 26 kg/m².     General: Alert, cooperative, no acute distress  Thyroid:  no enlargement/tenderness/palpable nodules  Lungs: Clear to auscultation bilaterally, respirations unlabored  Heart: Regular rate and rhythm, S1 and S2 normal, no murmur, rub or gallop  Abdomen: Soft, NT, ND and Bowel sounds Positive  Extremities:  Extremities normal, atraumatic, no cyanosis or edema    ==========================================================================    LAB EVALUATION    Lab Results   Component Value Date    GLUCOSE 98 01/08/2024    BUN 17 01/08/2024    CREATININE 1.18 (H) 01/08/2024    EGFRIFAFRI 87 02/15/2022    BCR 14.4 01/08/2024    K 3.7 01/08/2024    CO2 26.0 01/08/2024    CALCIUM 9.1 01/08/2024    ALBUMIN 4.5 01/08/2024    LABIL2 1.1 02/13/2018    AST 12 01/08/2024    ALT 12 01/08/2024    CHOL 203 (H) 02/15/2022    TRIG 109 02/15/2022    HDL 63 (H) 02/15/2022     (H) 02/15/2022     No results found for: \"HGBA1C\"  Lab Results   Component Value Date    CREATININE 1.18 (H) 01/08/2024     Lab Results   Component Value Date    TSH <0.005 (L) 12/02/2024    FREET4 1.74 (H) 12/02/2024      Latest Reference Range & Units 09/11/23 11:25   Thyroid Stimulating Immunoglobulin 0.00 - 0.55 IU/L 3.13 (H)   (H): Data is abnormally high    ==========================================================================    ASSESSMENT AND PLAN    # Hyperthyroidism due to Graves' disease  # Exophthalmos, follow with " "ophthalmology  # Hyperlipidemia  # Hypertension  # Overweight with BMI of 26.00    - Plan for repeat blood work to be collected today TSH and free T4  - Continue methimazole 5 mg twice a week  - Repeat blood work in 6 weeks and in 3 months time    Return to clinic: 3 months    Entire assessment and plan was discussed and counseled the patient in detail to which patient verbalized understanding and agreed with care.  Answered all queries and concerns.    This note was created using voice recognition software and is inherently subject to errors including those of syntax and \"sound-alike\" substitutions which may escape proofreading.  In such instances, original meaning may be extrapolated by contextual derivation.    Note: Portions of this note may have been copied from previous notes but documentation have been reviewed and edited as necessary to support clinical decision making for today's visit.    ==========================================================================    INFORMATION PROVIDED TO PATIENT    Patient Instructions   Please,    - Continue methimazole 5 mg twice a week.  - Plan for repeat blood work today, repeat blood work in 6 weeks and in 3 months time    Follow-up in my clinic back again in 3 months.    Thank you for your visit today.    If you have any questions or concerns please feel free to reach out of the office.       ==========================================================================  Julian Mullins MD  Department of Endocrine, Diabetes and Metabolism  [facility], [place]  ==========================================================================  "

## 2025-02-12 LAB
ALBUMIN SERPL-MCNC: 4.3 G/DL (ref 3.9–4.9)
ALP SERPL-CCNC: 87 IU/L (ref 44–121)
ALT SERPL-CCNC: 21 IU/L (ref 0–32)
AST SERPL-CCNC: 19 IU/L (ref 0–40)
BASOPHILS # BLD AUTO: 0 X10E3/UL (ref 0–0.2)
BASOPHILS NFR BLD AUTO: 1 %
BILIRUB SERPL-MCNC: 0.6 MG/DL (ref 0–1.2)
BUN SERPL-MCNC: 11 MG/DL (ref 8–27)
BUN/CREAT SERPL: 13 (ref 12–28)
CALCIUM SERPL-MCNC: 9.7 MG/DL (ref 8.7–10.3)
CHLORIDE SERPL-SCNC: 100 MMOL/L (ref 96–106)
CO2 SERPL-SCNC: 24 MMOL/L (ref 20–29)
CREAT SERPL-MCNC: 0.82 MG/DL (ref 0.57–1)
EGFRCR SERPLBLD CKD-EPI 2021: 79 ML/MIN/1.73
EOSINOPHIL # BLD AUTO: 0 X10E3/UL (ref 0–0.4)
EOSINOPHIL NFR BLD AUTO: 1 %
ERYTHROCYTE [DISTWIDTH] IN BLOOD BY AUTOMATED COUNT: 13.4 % (ref 11.7–15.4)
GLOBULIN SER CALC-MCNC: 3.3 G/DL (ref 1.5–4.5)
GLUCOSE SERPL-MCNC: 97 MG/DL (ref 70–99)
HCT VFR BLD AUTO: 37.3 % (ref 34–46.6)
HGB BLD-MCNC: 12.1 G/DL (ref 11.1–15.9)
IMM GRANULOCYTES # BLD AUTO: 0 X10E3/UL (ref 0–0.1)
IMM GRANULOCYTES NFR BLD AUTO: 0 %
LYMPHOCYTES # BLD AUTO: 2.2 X10E3/UL (ref 0.7–3.1)
LYMPHOCYTES NFR BLD AUTO: 41 %
MCH RBC QN AUTO: 26.2 PG (ref 26.6–33)
MCHC RBC AUTO-ENTMCNC: 32.4 G/DL (ref 31.5–35.7)
MCV RBC AUTO: 81 FL (ref 79–97)
MONOCYTES # BLD AUTO: 0.5 X10E3/UL (ref 0.1–0.9)
MONOCYTES NFR BLD AUTO: 10 %
NEUTROPHILS # BLD AUTO: 2.5 X10E3/UL (ref 1.4–7)
NEUTROPHILS NFR BLD AUTO: 47 %
PLATELET # BLD AUTO: 310 X10E3/UL (ref 150–450)
POTASSIUM SERPL-SCNC: 4.1 MMOL/L (ref 3.5–5.2)
PROT SERPL-MCNC: 7.6 G/DL (ref 6–8.5)
RBC # BLD AUTO: 4.62 X10E6/UL (ref 3.77–5.28)
SODIUM SERPL-SCNC: 139 MMOL/L (ref 134–144)
T4 FREE SERPL-MCNC: 1.6 NG/DL (ref 0.82–1.77)
TSH SERPL DL<=0.005 MIU/L-ACNC: <0.005 UIU/ML (ref 0.45–4.5)
WBC # BLD AUTO: 5.3 X10E3/UL (ref 3.4–10.8)

## 2025-02-16 DIAGNOSIS — E05.00 GRAVES' DISEASE: ICD-10-CM

## 2025-02-16 RX ORDER — METHIMAZOLE 5 MG/1
5 TABLET ORAL DAILY
Qty: 90 TABLET | Refills: 1 | Status: SHIPPED | OUTPATIENT
Start: 2025-02-16 | End: 2025-08-15

## 2025-03-14 ENCOUNTER — ANESTHESIA EVENT (OUTPATIENT)
Dept: GASTROENTEROLOGY | Facility: HOSPITAL | Age: 65
End: 2025-03-14
Payer: COMMERCIAL

## 2025-03-14 ENCOUNTER — HOSPITAL ENCOUNTER (OUTPATIENT)
Facility: HOSPITAL | Age: 65
Setting detail: HOSPITAL OUTPATIENT SURGERY
Discharge: HOME | End: 2025-03-14
Attending: INTERNAL MEDICINE | Admitting: INTERNAL MEDICINE
Payer: COMMERCIAL

## 2025-03-14 ENCOUNTER — ANESTHESIA (OUTPATIENT)
Dept: GASTROENTEROLOGY | Facility: HOSPITAL | Age: 65
End: 2025-03-14
Payer: COMMERCIAL

## 2025-03-14 ENCOUNTER — ON CAMPUS - OUTPATIENT (OUTPATIENT)
Dept: URBAN - METROPOLITAN AREA HOSPITAL 85 | Facility: HOSPITAL | Age: 65
End: 2025-03-14
Payer: COMMERCIAL

## 2025-03-14 VITALS
WEIGHT: 168 LBS | HEIGHT: 69 IN | DIASTOLIC BLOOD PRESSURE: 88 MMHG | HEART RATE: 65 BPM | SYSTOLIC BLOOD PRESSURE: 135 MMHG | OXYGEN SATURATION: 100 % | TEMPERATURE: 97.7 F | RESPIRATION RATE: 16 BRPM | BODY MASS INDEX: 24.88 KG/M2

## 2025-03-14 DIAGNOSIS — D12.4 BENIGN NEOPLASM OF DESCENDING COLON: ICD-10-CM

## 2025-03-14 DIAGNOSIS — R19.5 OTHER FECAL ABNORMALITIES: ICD-10-CM

## 2025-03-14 DIAGNOSIS — R19.5 POSITIVE COLORECTAL CANCER SCREENING USING COLOGUARD TEST: ICD-10-CM

## 2025-03-14 DIAGNOSIS — D12.0 BENIGN NEOPLASM OF CECUM: ICD-10-CM

## 2025-03-14 DIAGNOSIS — Z12.11 ENCOUNTER FOR SCREENING FOR MALIGNANT NEOPLASM OF COLON: ICD-10-CM

## 2025-03-14 PROCEDURE — 88305 TISSUE EXAM BY PATHOLOGIST: CPT | Performed by: INTERNAL MEDICINE

## 2025-03-14 PROCEDURE — 25010000002 GLYCOPYRROLATE 0.2 MG/ML SOLUTION: Performed by: NURSE ANESTHETIST, CERTIFIED REGISTERED

## 2025-03-14 PROCEDURE — 45385 COLONOSCOPY W/LESION REMOVAL: CPT | Mod: 33 | Performed by: INTERNAL MEDICINE

## 2025-03-14 PROCEDURE — 25010000002 PROPOFOL 200 MG/20ML EMULSION: Performed by: NURSE ANESTHETIST, CERTIFIED REGISTERED

## 2025-03-14 PROCEDURE — 25810000003 SODIUM CHLORIDE 0.9 % SOLUTION: Performed by: NURSE ANESTHETIST, CERTIFIED REGISTERED

## 2025-03-14 PROCEDURE — 25010000002 LIDOCAINE HCL (CARDIAC) PF 100 MG/5ML SOLUTION PREFILLED SYRINGE: Performed by: NURSE ANESTHETIST, CERTIFIED REGISTERED

## 2025-03-14 PROCEDURE — 25010000002 PROPOFOL 1000 MG/100ML EMULSION: Performed by: NURSE ANESTHETIST, CERTIFIED REGISTERED

## 2025-03-14 RX ORDER — SODIUM CHLORIDE 9 MG/ML
30 INJECTION, SOLUTION INTRAVENOUS CONTINUOUS PRN
Status: DISCONTINUED | OUTPATIENT
Start: 2025-03-14 | End: 2025-03-14 | Stop reason: HOSPADM

## 2025-03-14 RX ORDER — LABETALOL HYDROCHLORIDE 5 MG/ML
5 INJECTION, SOLUTION INTRAVENOUS
Status: DISCONTINUED | OUTPATIENT
Start: 2025-03-14 | End: 2025-03-14 | Stop reason: HOSPADM

## 2025-03-14 RX ORDER — SODIUM CHLORIDE 9 MG/ML
INJECTION, SOLUTION INTRAVENOUS CONTINUOUS PRN
Status: DISCONTINUED | OUTPATIENT
Start: 2025-03-14 | End: 2025-03-14 | Stop reason: SURG

## 2025-03-14 RX ORDER — LIDOCAINE HYDROCHLORIDE 20 MG/ML
INJECTION, SOLUTION INTRAVENOUS AS NEEDED
Status: DISCONTINUED | OUTPATIENT
Start: 2025-03-14 | End: 2025-03-14 | Stop reason: SURG

## 2025-03-14 RX ORDER — ONDANSETRON 2 MG/ML
4 INJECTION INTRAMUSCULAR; INTRAVENOUS ONCE AS NEEDED
Status: DISCONTINUED | OUTPATIENT
Start: 2025-03-14 | End: 2025-03-14 | Stop reason: HOSPADM

## 2025-03-14 RX ORDER — HYDRALAZINE HYDROCHLORIDE 20 MG/ML
5 INJECTION INTRAMUSCULAR; INTRAVENOUS
Status: DISCONTINUED | OUTPATIENT
Start: 2025-03-14 | End: 2025-03-14 | Stop reason: HOSPADM

## 2025-03-14 RX ORDER — DEXMEDETOMIDINE HYDROCHLORIDE 100 UG/ML
INJECTION, SOLUTION INTRAVENOUS AS NEEDED
Status: DISCONTINUED | OUTPATIENT
Start: 2025-03-14 | End: 2025-03-14 | Stop reason: SURG

## 2025-03-14 RX ORDER — IPRATROPIUM BROMIDE AND ALBUTEROL SULFATE 2.5; .5 MG/3ML; MG/3ML
3 SOLUTION RESPIRATORY (INHALATION) ONCE AS NEEDED
Status: DISCONTINUED | OUTPATIENT
Start: 2025-03-14 | End: 2025-03-14 | Stop reason: HOSPADM

## 2025-03-14 RX ORDER — PROPOFOL 10 MG/ML
INJECTION, EMULSION INTRAVENOUS AS NEEDED
Status: DISCONTINUED | OUTPATIENT
Start: 2025-03-14 | End: 2025-03-14 | Stop reason: SURG

## 2025-03-14 RX ORDER — DIPHENHYDRAMINE HYDROCHLORIDE 50 MG/ML
12.5 INJECTION INTRAMUSCULAR; INTRAVENOUS
Status: DISCONTINUED | OUTPATIENT
Start: 2025-03-14 | End: 2025-03-14 | Stop reason: HOSPADM

## 2025-03-14 RX ORDER — GLYCOPYRROLATE 0.2 MG/ML
INJECTION INTRAMUSCULAR; INTRAVENOUS AS NEEDED
Status: DISCONTINUED | OUTPATIENT
Start: 2025-03-14 | End: 2025-03-14 | Stop reason: SURG

## 2025-03-14 RX ORDER — EPHEDRINE SULFATE 5 MG/ML
5 INJECTION INTRAVENOUS ONCE AS NEEDED
Status: DISCONTINUED | OUTPATIENT
Start: 2025-03-14 | End: 2025-03-14 | Stop reason: HOSPADM

## 2025-03-14 RX ORDER — PROPOFOL 10 MG/ML
INJECTION, EMULSION INTRAVENOUS CONTINUOUS PRN
Status: DISCONTINUED | OUTPATIENT
Start: 2025-03-14 | End: 2025-03-14 | Stop reason: SURG

## 2025-03-14 RX ADMIN — SODIUM CHLORIDE: 9 INJECTION, SOLUTION INTRAVENOUS at 11:03

## 2025-03-14 RX ADMIN — LIDOCAINE HYDROCHLORIDE 60 MG: 20 INJECTION, SOLUTION INTRAVENOUS at 11:10

## 2025-03-14 RX ADMIN — PROPOFOL 20 MG: 10 INJECTION, EMULSION INTRAVENOUS at 11:11

## 2025-03-14 RX ADMIN — GLYCOPYRROLATE 0.2 MG: 0.2 INJECTION, SOLUTION INTRAMUSCULAR; INTRAVENOUS at 11:06

## 2025-03-14 RX ADMIN — DEXMEDETOMIDINE HYDROCHLORIDE 4 MCG: 100 INJECTION, SOLUTION INTRAVENOUS at 11:13

## 2025-03-14 RX ADMIN — PROPOFOL 20 MG: 10 INJECTION, EMULSION INTRAVENOUS at 11:12

## 2025-03-14 RX ADMIN — PROPOFOL 50 MG: 10 INJECTION, EMULSION INTRAVENOUS at 11:10

## 2025-03-14 RX ADMIN — DEXMEDETOMIDINE HYDROCHLORIDE 2 MCG: 100 INJECTION, SOLUTION INTRAVENOUS at 11:16

## 2025-03-14 RX ADMIN — DEXMEDETOMIDINE HYDROCHLORIDE 4 MCG: 100 INJECTION, SOLUTION INTRAVENOUS at 11:10

## 2025-03-14 RX ADMIN — PROPOFOL INJECTABLE EMULSION 150 MCG/KG/MIN: 10 INJECTION, EMULSION INTRAVENOUS at 11:10

## 2025-03-14 NOTE — H&P
" LOS: 0 days   Patient Care Team:  Crystal Guillen MD as PCP - General (Family Medicine)      Subjective     Interval History:     Subjective: screening      ROS:   No chest pain, shortness of breath, or cough.        Medication Review:   No current facility-administered medications for this encounter.      Objective     Vital Signs  Vitals:    02/28/25 1055 03/14/25 1015   BP:  159/84   BP Location:  Left arm   Patient Position:  Lying   Pulse:  63   Resp:  13   Temp:  97.7 °F (36.5 °C)   TempSrc:  Oral   SpO2:  100%   Weight: 77.6 kg (171 lb) 76.2 kg (168 lb)   Height: 175.3 cm (69\") 175.3 cm (69\")       Physical Exam:    General Appearance:    Awake and alert, in no acute distress   Head:    Normocephalic, without obvious abnormality   Eyes:          Conjunctivae normal, anicteric sclerae   Throat:   No oral lesions, no thrush, oral mucosa moist   Neck:   No adenopathy, supple, no JVD   Lungs:     respirations regular, even and unlabored   Abdomen:     Soft, non-aurelia, no rebound or guarding, nondistended, no hepatosplenomegaly   Rectal:     Deferred   Extremities:   No edema, no cyanosis   Skin:   No bruising or rash, no jaundice        Results Review:    Lab Results (last 24 hours)       ** No results found for the last 24 hours. **            Imaging Results (Last 24 Hours)       ** No results found for the last 24 hours. **              Assessment & Plan   Proceed with scope and MAC anesthesia        Jhonny Newsome MD  03/14/25  11:00 EDT  "

## 2025-03-14 NOTE — DISCHARGE INSTRUCTIONS
A responsible adult should stay with you and you should rest quietly for the rest of the day.    Do not drink alcohol, drive, operate any heavy machinery or power tools or make any legal/important decisions for the next 24 hours.     Progress your diet as tolerated.  If you begin to experience severe pain, increased shortness of breath, racing heartbeat or a fever above 101 F, seek immediate medical attention.     Follow up with MD as instructed. Call office for results in 3 to 5 days if needed.    [phone]    Impression:  Colon polyps x 5, small internal hemorrhoids     Recommendations:  Follow-up histopathology  High-fiber diet  Repeat colonoscopy in 3 years if polyps are adenomas and in 5 years if polyps are hyperplastic.

## 2025-03-14 NOTE — ANESTHESIA POSTPROCEDURE EVALUATION
Patient: Yaa Walls    Procedure Summary       Date: 03/14/25 Room / Location: [facility] ENDOSCOPY 1 / [facility] ENDOSCOPY    Anesthesia Start: 1103 Anesthesia Stop: 1134    Procedure: COLONOSCOPY WITH COLD SNARE POLYPECTOMY X 5 Diagnosis:       Positive colorectal cancer screening using Cologuard test      (Positive colorectal cancer screening using Cologuard test [R19.5])    Surgeons: Jhonny Newsome MD Provider: Jonathan Romero MD    Anesthesia Type: general ASA Status: 2            Anesthesia Type: general    Vitals  Vitals Value Taken Time   /88 03/14/25 11:55   Temp     Pulse 65 03/14/25 11:55   Resp 16 03/14/25 11:55   SpO2 100 % 03/14/25 11:55           Post Anesthesia Care and Evaluation    Patient location during evaluation: PACU  Patient participation: complete - patient participated  Level of consciousness: awake  Pain score: 0  Pain management: adequate  Anesthetic complications: No anesthetic complications  PONV Status: none  Cardiovascular status: acceptable  Respiratory status: acceptable  Hydration status: acceptable

## 2025-03-14 NOTE — OP NOTE
COLONOSCOPY Procedure Report    Patient Name:  Yaa Walls  YOB: 1960    Date of Surgery:  3/14/2025     Pre-Op Diagnosis:  Positive colorectal cancer screening using Cologuard test [R19.5]       Post Op Diagnosis:  Colon polyps x 5      Procedure/CPT® Codes:  No CPT Code Applied in Case Entry    Procedure(s):  COLONOSCOPY WITH COLD SNARE POLYPECTOMY X 5    Staff:  Surgeon(s):  Jhonny Newsome MD         Anesthesia: Monitored Anesthesia Care    Implants:    Nothing was implanted during the procedure    Specimen:        See below    No blood loss    Complications:  None    Description of Procedure:  Informed consent was obtained for the procedure, including sedation.  Risks of perforation, hemorrhage, adverse drug reaction and aspiration were discussed.  The patient was brought into the endoscopy suite. Continuous cardiopulmonary monitoring was performed.  The patient was placed in the left lateral decubitus position. After adequate sedation was attained, the digital rectal exam was performed which was normal.  Subsequently, the Olympus colonoscope was inserted into the patient's rectum and advanced to the level of the cecum and terminal ileum without difficulty.  The bowel prep was very good.  Circumferential examination of the patient's colon was performed on scope withdrawal.  A retroflex exam was performed in the rectum which showed normal mucosa, small internal hemorrhoid.  The bowel was decompressed, the scope was withdrawn from the patient, and the patient tolerated the procedure well. There were no immediate post-operative complications.     Findings:    Normal mucosa of the terminal ileum  Cecal polyps x 2, 4-5 mm in size, removed in single piece fashion with cold snare polypectomy.  Descending colon polyps x 3, 4-6 mm in size, removed in single piece fashion with cold snare polypectomy.  Small internal hemorrhoids    Impression:  Colon polyps x 5, small internal  hemorrhoids    Recommendations:  Follow-up histopathology  High-fiber diet  Repeat colonoscopy in 3 years if polyps are adenomas and in 5 years if polyps are hyperplastic.      Jhonny Newsome MD     Date: 3/14/2025  Time: 11:36 EDT

## 2025-03-14 NOTE — ANESTHESIA PREPROCEDURE EVALUATION
Anesthesia Evaluation     Patient summary reviewed and Nursing notes reviewed   NPO Solid Status: > 8 hours             Airway   Mallampati: II  TM distance: >3 FB  Neck ROM: full  No difficulty expected  Dental - normal exam     Pulmonary - normal exam   (+) a smoker Current,  Cardiovascular - normal exam    ECG reviewed    (+) hypertension, hyperlipidemia  (-) angina, BENDER      Neuro/Psych- negative ROS  GI/Hepatic/Renal/Endo    (+) thyroid problem hypothyroidism    Musculoskeletal (-) negative ROS    Abdominal  - normal exam    Bowel sounds: normal.   Substance History - negative use     OB/GYN negative ob/gyn ROS         Other                    Anesthesia Plan    ASA 2     general       Anesthetic plan, risks, benefits, and alternatives have been provided, discussed and informed consent has been obtained with: patient.    CODE STATUS:

## 2025-03-17 LAB
LAB AP CASE REPORT: NORMAL
PATH REPORT.FINAL DX SPEC: NORMAL
PATH REPORT.GROSS SPEC: NORMAL

## 2025-03-18 ENCOUNTER — OFFICE VISIT (OUTPATIENT)
Dept: FAMILY MEDICINE CLINIC | Facility: CLINIC | Age: 65
End: 2025-03-18
Payer: COMMERCIAL

## 2025-03-18 VITALS
SYSTOLIC BLOOD PRESSURE: 126 MMHG | HEART RATE: 63 BPM | BODY MASS INDEX: 24.29 KG/M2 | WEIGHT: 164 LBS | DIASTOLIC BLOOD PRESSURE: 78 MMHG | HEIGHT: 69 IN | OXYGEN SATURATION: 96 %

## 2025-03-18 DIAGNOSIS — Z00.00 ENCOUNTER FOR GENERAL ADULT MEDICAL EXAMINATION WITHOUT ABNORMAL FINDINGS: Primary | ICD-10-CM

## 2025-03-18 DIAGNOSIS — Z23 NEED FOR PNEUMOCOCCAL 20-VALENT CONJUGATE VACCINATION: ICD-10-CM

## 2025-03-18 DIAGNOSIS — Z78.0 POSTMENOPAUSAL STATUS: ICD-10-CM

## 2025-03-18 DIAGNOSIS — Z12.31 ENCOUNTER FOR SCREENING MAMMOGRAM FOR MALIGNANT NEOPLASM OF BREAST: ICD-10-CM

## 2025-03-18 NOTE — PROGRESS NOTES
Vito Walls is a 64 y.o. female.     History of Present Illness  The patient is a 64-year-old female who presents for a physical exam.    She reports no unusual headaches or chest pain. Approximately 3 weeks ago, she experienced an episode of coughing and chest discomfort, which she managed with Coricidin HBP, throat tea, and orange juice for 2 consecutive days. This regimen appeared to alleviate her symptoms. She does not experience any abdominal pain and reports normal bowel movements and urination. She has recently undergone a colonoscopy and is awaiting the results. She expresses interest in receiving the pneumonia vaccine. She reports overall emotional well-being. She has recently relocated to a new apartment and plans to incorporate walking and stretching into her routine. She has retired from [employer] but has not yet started receiving her social security benefits.    SOCIAL HISTORY  She has recently relocated to a new apartment and plans to incorporate walking and stretching into her routine. She has retired from [employer] but has not yet started receiving her social security benefits.    MEDICATIONS  Coricidin HBP       The following portions of the patient's history were reviewed and updated as appropriate: allergies, current medications, past family history, past medical history, past social history, past surgical history, and problem list.  Past Medical History:   Diagnosis Date    Allergic     Arthritis     Encounter for general adult medical examination without abnormal findings 09/12/2011    Fibroids     Graves disease     Hypertension     10 years or more    Hyperthyroidism     Hypothyroidism     Low back pain      Past Surgical History:   Procedure Laterality Date    COLONOSCOPY  Sept    Could I use the Colongard    COLONOSCOPY N/A 3/14/2025    Procedure: COLONOSCOPY WITH COLD SNARE POLYPECTOMY X 5;  Surgeon: Jhonny Newsome MD;  Location: [facility] ENDOSCOPY;  Service:  Gastroenterology;  Laterality: N/A;  POST-POLYPS, INTERNAL HEMORRHOIDS    HYSTERECTOMY      With BSO     OOPHORECTOMY       Family History   Problem Relation Age of Onset    Osteoporosis Mother     Arthritis Mother             COPD Mother     Diabetes Father     Hypertension Father     Arthritis Father             Asthma Sister              Social History     Socioeconomic History    Marital status: Single    Number of children: 0    Years of education: 13   Tobacco Use    Smoking status: Some Days     Current packs/day: 0.25     Average packs/day: 0.3 packs/day for 16.2 years (4.1 ttl pk-yrs)     Types: Cigarettes     Start date: 2009     Passive exposure: Yes    Smokeless tobacco: Never   Vaping Use    Vaping status: Never Used   Substance and Sexual Activity    Alcohol use: Yes     Alcohol/week: 3.0 standard drinks of alcohol     Types: 3 Cans of beer per week     Comment: social    Drug use: No    Sexual activity: Yes     Partners: Male     Birth control/protection: None, Hysterectomy         Current Outpatient Medications:     cetirizine (EQ Allergy Relief, Cetirizine,) 10 MG tablet, Take 1 tablet by mouth Daily., Disp: 90 tablet, Rfl: 3    estradiol (ESTRACE) 1 MG tablet, Take 1 tablet by mouth Daily., Disp: 90 tablet, Rfl: 3    fluticasone (FLONASE) 50 MCG/ACT nasal spray, Administer 2 sprays into the nostril(s) as directed by provider Daily., Disp: 48 g, Rfl: 3    lisinopril-hydrochlorothiazide (PRINZIDE,ZESTORETIC) 20-12.5 MG per tablet, Take 2 tablets by mouth Daily., Disp: 180 tablet, Rfl: 3    meloxicam (Mobic) 15 MG tablet, Take 1 tablet by mouth Daily., Disp: 90 tablet, Rfl: 1    methIMAzole (TAPAZOLE) 5 MG tablet, Take 1 tablet by mouth Daily for 180 days., Disp: 90 tablet, Rfl: 1    oxybutynin XL (Ditropan XL) 10 MG 24 hr tablet, Take 1 tablet by mouth Daily. For bloadder, Disp: 90 tablet, Rfl: 3    erythromycin (ROMYCIN) 5 MG/GM ophthalmic ointment, APPLY TO THE LOWER  "EYELID OF AFFECTED EYE 4 TIMES A DAY FOR 7 DAYS (Patient not taking: Reported on 3/18/2025), Disp: , Rfl:     ofloxacin (Ocuflox) 0.3 % ophthalmic solution, Administer 1 drop into the left eye 4 (Four) Times a Day. (Patient not taking: Reported on 3/18/2025), Disp: 5 mL, Rfl: 0    Review of Systems  ROS done and noted in HPI    /78 (BP Location: Left arm, Patient Position: Sitting, Cuff Size: Large Adult)   Pulse 63   Ht 175.3 cm (69\")   Wt 74.4 kg (164 lb)   SpO2 96%   BMI 24.22 kg/m²   BMI is within normal parameters. No other follow-up for BMI required.       Objective   Physical Exam  Vitals and nursing note reviewed. Exam conducted with a chaperone present.   Constitutional:       Appearance: Normal appearance. She is well-developed, well-groomed and normal weight.   HENT:      Head: Normocephalic and atraumatic.      Right Ear: Tympanic membrane, ear canal and external ear normal.      Left Ear: Tympanic membrane, ear canal and external ear normal.      Nose: Nose normal.      Mouth/Throat:      Mouth: Mucous membranes are moist.      Pharynx: Oropharynx is clear.   Eyes:      Extraocular Movements: Extraocular movements intact.      Conjunctiva/sclera: Conjunctivae normal.      Pupils: Pupils are equal, round, and reactive to light.   Neck:      Thyroid: No thyromegaly.      Vascular: No carotid bruit.   Cardiovascular:      Rate and Rhythm: Normal rate and regular rhythm.      Pulses: Normal pulses.      Heart sounds: Normal heart sounds.   Pulmonary:      Effort: Pulmonary effort is normal.      Breath sounds: Normal breath sounds.   Chest:   Breasts:     Right: Normal.      Left: Normal.   Abdominal:      General: Abdomen is flat. Bowel sounds are normal.      Palpations: Abdomen is soft. There is no hepatomegaly, splenomegaly or mass.      Tenderness: There is no abdominal tenderness.      Hernia: No hernia is present.   Musculoskeletal:      Cervical back: Normal range of motion and neck " supple.      Right lower leg: No edema.      Left lower leg: No edema.   Lymphadenopathy:      Cervical: No cervical adenopathy.      Upper Body:      Right upper body: No supraclavicular or axillary adenopathy.      Left upper body: No supraclavicular or axillary adenopathy.   Skin:     General: Skin is warm and dry.      Findings: No lesion or rash.   Neurological:      General: No focal deficit present.      Mental Status: She is alert.      Motor: Motor function is intact.      Deep Tendon Reflexes: Reflexes are normal and symmetric.   Psychiatric:         Attention and Perception: Attention normal.         Mood and Affect: Mood normal.         Behavior: Behavior is cooperative.       Physical Exam  Lungs were auscultated.       Results         Assessment & Plan   Problems Addressed this Visit          Health Encounters    Encounter for general adult medical examination without abnormal findings - Primary    Relevant Orders    Comprehensive Metabolic Panel    Lipid Panel    Hemoglobin A1c     Other Visit Diagnoses         Encounter for screening mammogram for malignant neoplasm of breast        Relevant Orders    Mammo Screening Digital Tomosynthesis Bilateral With CAD      Postmenopausal status        Relevant Orders    DEXA Bone Density Axial      Need for pneumococcal 20-valent conjugate vaccination        Relevant Orders    Pneumococcal Conjugate Vaccine 20-Valent (PCV20) (Completed)          Diagnoses         Codes Comments      Encounter for general adult medical examination without abnormal findings    -  Primary ICD-10-CM: Z00.00  ICD-9-CM: V70.9       Encounter for screening mammogram for malignant neoplasm of breast     ICD-10-CM: Z12.31  ICD-9-CM: V76.12       Postmenopausal status     ICD-10-CM: Z78.0  ICD-9-CM: V49.81       Need for pneumococcal 20-valent conjugate vaccination     ICD-10-CM: Z23  ICD-9-CM: V03.82           Assessment & Plan  1. Health maintenance.  A comprehensive blood work panel  has been ordered. A mammogram has also been scheduled. She will receive the pneumonia vaccine today.  She was congratulated on her weight loss.  She was counseled on the need to continue working toward complete smoking cessation.  CMP, lipid, A1c were ordered.            Patient or patient representative verbalized consent for the use of Ambient Listening during the visit with  Crystal Guillen MD for chart documentation. 3/18/2025  13:42 EDT

## 2025-05-07 LAB
ALBUMIN SERPL-MCNC: 4.3 G/DL (ref 3.9–4.9)
ALP SERPL-CCNC: 94 IU/L (ref 44–121)
ALT SERPL-CCNC: 13 IU/L (ref 0–32)
AST SERPL-CCNC: 21 IU/L (ref 0–40)
BASOPHILS # BLD AUTO: 0.1 X10E3/UL (ref 0–0.2)
BASOPHILS NFR BLD AUTO: 1 %
BILIRUB SERPL-MCNC: 0.3 MG/DL (ref 0–1.2)
BUN SERPL-MCNC: 12 MG/DL (ref 8–27)
BUN/CREAT SERPL: 13 (ref 12–28)
CALCIUM SERPL-MCNC: 9.3 MG/DL (ref 8.7–10.3)
CHLORIDE SERPL-SCNC: 101 MMOL/L (ref 96–106)
CO2 SERPL-SCNC: 25 MMOL/L (ref 20–29)
CREAT SERPL-MCNC: 0.92 MG/DL (ref 0.57–1)
EGFRCR SERPLBLD CKD-EPI 2021: 69 ML/MIN/1.73
EOSINOPHIL # BLD AUTO: 0.1 X10E3/UL (ref 0–0.4)
EOSINOPHIL NFR BLD AUTO: 2 %
ERYTHROCYTE [DISTWIDTH] IN BLOOD BY AUTOMATED COUNT: 15.7 % (ref 11.7–15.4)
GLOBULIN SER CALC-MCNC: 3.2 G/DL (ref 1.5–4.5)
GLUCOSE SERPL-MCNC: 82 MG/DL (ref 70–99)
HCT VFR BLD AUTO: 37.2 % (ref 34–46.6)
HGB BLD-MCNC: 11.9 G/DL (ref 11.1–15.9)
IMM GRANULOCYTES # BLD AUTO: 0 X10E3/UL (ref 0–0.1)
IMM GRANULOCYTES NFR BLD AUTO: 0 %
LYMPHOCYTES # BLD AUTO: 2.4 X10E3/UL (ref 0.7–3.1)
LYMPHOCYTES NFR BLD AUTO: 40 %
MCH RBC QN AUTO: 26.8 PG (ref 26.6–33)
MCHC RBC AUTO-ENTMCNC: 32 G/DL (ref 31.5–35.7)
MCV RBC AUTO: 84 FL (ref 79–97)
MONOCYTES # BLD AUTO: 0.5 X10E3/UL (ref 0.1–0.9)
MONOCYTES NFR BLD AUTO: 9 %
NEUTROPHILS # BLD AUTO: 2.9 X10E3/UL (ref 1.4–7)
NEUTROPHILS NFR BLD AUTO: 48 %
PLATELET # BLD AUTO: 318 X10E3/UL (ref 150–450)
POTASSIUM SERPL-SCNC: 4.4 MMOL/L (ref 3.5–5.2)
PROT SERPL-MCNC: 7.5 G/DL (ref 6–8.5)
RBC # BLD AUTO: 4.44 X10E6/UL (ref 3.77–5.28)
SODIUM SERPL-SCNC: 140 MMOL/L (ref 134–144)
T4 FREE SERPL-MCNC: 0.73 NG/DL (ref 0.82–1.77)
TSH SERPL DL<=0.005 MIU/L-ACNC: 1.18 UIU/ML (ref 0.45–4.5)
WBC # BLD AUTO: 5.9 X10E3/UL (ref 3.4–10.8)

## 2025-05-13 ENCOUNTER — OFFICE VISIT (OUTPATIENT)
Dept: ENDOCRINOLOGY | Facility: CLINIC | Age: 65
End: 2025-05-13
Payer: COMMERCIAL

## 2025-05-13 VITALS
OXYGEN SATURATION: 99 % | SYSTOLIC BLOOD PRESSURE: 130 MMHG | HEART RATE: 69 BPM | WEIGHT: 170 LBS | HEIGHT: 69 IN | DIASTOLIC BLOOD PRESSURE: 80 MMHG | BODY MASS INDEX: 25.18 KG/M2

## 2025-05-13 DIAGNOSIS — E66.3 OVERWEIGHT: ICD-10-CM

## 2025-05-13 DIAGNOSIS — E78.2 MIXED HYPERLIPIDEMIA: ICD-10-CM

## 2025-05-13 DIAGNOSIS — E05.00 GRAVES' DISEASE: Primary | ICD-10-CM

## 2025-05-13 DIAGNOSIS — I10 PRIMARY HYPERTENSION: ICD-10-CM

## 2025-05-13 DIAGNOSIS — E05.90 HYPERTHYROIDISM: ICD-10-CM

## 2025-05-13 DIAGNOSIS — H05.20 EXOPHTHALMOS: ICD-10-CM

## 2025-05-13 PROCEDURE — 99214 OFFICE O/P EST MOD 30 MIN: CPT | Performed by: INTERNAL MEDICINE

## 2025-05-13 RX ORDER — METHIMAZOLE 5 MG/1
5 TABLET ORAL DAILY
Qty: 90 TABLET | Refills: 1 | Status: SHIPPED | OUTPATIENT
Start: 2025-05-13 | End: 2025-11-09

## 2025-05-13 NOTE — PATIENT INSTRUCTIONS
Please,    - Change methimazole to 5 mgs, five days a week from Monday to Friday.  - Plan for repeat blood work in 2 months and in 4 months.    Follow-up in my clinic back again in 4 months.    Thank you for your visit today.    If you have any questions or concerns please feel free to reach out of the office.

## 2025-05-13 NOTE — PROGRESS NOTES
-----------------------------------------------------------------  ENDOCRINE CLINIC NOTE  -----------------------------------------------------------------        PATIENT NAME: Yaa Walls  PATIENT : 1960 AGE: 64 y.o.  MRN NUMBER: [id number]  PRIMARY CARE: Crystal Guillen MD    ==========================================================================    CHIEF COMPLAINT: Hyperthyroidism  DATE OF SERVICE: 25    ==========================================================================    HPI / SUBJECTIVE    64 y.o. female is seen in the clinic today for follow up of hyperthyroidism secondary to Graves' disease  Patient is currently taking methimazole 5 mg once a day.  No family history of thyroid disorder.  Patient was diagnosed with Graves' disease and started treatment in 2023.  Patient at some point was de-escalated to methimazole 2.5 mg twice a week but blood work again showed worsening thyroid function and methimazole therapy was up escalated again.  Recently had blood work done in May 2025 again showing evidence of now suppressed free T4 with normal TSH.  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    ==========================================================================                                                PAST MEDICAL HISTORY    Past Medical History:   Diagnosis Date    Allergic     Arthritis     Encounter for general adult medical examination without abnormal findings 2011    Fibroids     Graves disease     Hypertension     10 years or more    Hyperthyroidism     Hypothyroidism     Low back pain        ==========================================================================    PAST SURGICAL HISTORY    Past Surgical History:   Procedure Laterality Date    COLONOSCOPY  Sept    Could I use the Colongard    COLONOSCOPY N/A 3/14/2025    Procedure: COLONOSCOPY WITH COLD SNARE POLYPECTOMY X 5;  Surgeon: Jhonny Newsome,  MD;  Location: [facility] ENDOSCOPY;  Service: Gastroenterology;  Laterality: N/A;  POST-POLYPS, INTERNAL HEMORRHOIDS    HYSTERECTOMY      With BSO     OOPHORECTOMY         ==========================================================================    FAMILY HISTORY    Family History   Problem Relation Age of Onset    Osteoporosis Mother     Arthritis Mother             COPD Mother     Diabetes Father     Hypertension Father     Arthritis Father             Asthma Sister                ==========================================================================    SOCIAL HISTORY    Social History     Socioeconomic History    Marital status: Single    Number of children: 0    Years of education: 13   Tobacco Use    Smoking status: Some Days     Current packs/day: 0.25     Average packs/day: 0.3 packs/day for 16.4 years (4.1 ttl pk-yrs)     Types: Cigarettes     Start date: 2009     Passive exposure: Yes    Smokeless tobacco: Never   Vaping Use    Vaping status: Never Used   Substance and Sexual Activity    Alcohol use: Yes     Alcohol/week: 3.0 standard drinks of alcohol     Types: 3 Cans of beer per week     Comment: social    Drug use: No    Sexual activity: Yes     Partners: Male     Birth control/protection: None, Hysterectomy       ==========================================================================    MEDICATIONS      Current Outpatient Medications:     cetirizine (EQ Allergy Relief, Cetirizine,) 10 MG tablet, Take 1 tablet by mouth Daily., Disp: 90 tablet, Rfl: 3    estradiol (ESTRACE) 1 MG tablet, Take 1 tablet by mouth Daily., Disp: 90 tablet, Rfl: 3    fluticasone (FLONASE) 50 MCG/ACT nasal spray, Administer 2 sprays into the nostril(s) as directed by provider Daily., Disp: 48 g, Rfl: 3    lisinopril-hydrochlorothiazide (PRINZIDE,ZESTORETIC) 20-12.5 MG per tablet, Take 2 tablets by mouth Daily., Disp: 180 tablet, Rfl: 3    meloxicam (Mobic) 15 MG tablet, Take 1 tablet by mouth  "Daily., Disp: 90 tablet, Rfl: 1    methIMAzole (TAPAZOLE) 5 MG tablet, Take 1 tablet by mouth Daily for 180 days., Disp: 90 tablet, Rfl: 1    oxybutynin XL (Ditropan XL) 10 MG 24 hr tablet, Take 1 tablet by mouth Daily. For bloadder, Disp: 90 tablet, Rfl: 3    ==========================================================================    ALLERGIES    No Known Allergies    ==========================================================================    OBJECTIVE    Vitals:    05/13/25 1204   BP: 130/80   Pulse: 69   SpO2: 99%     Body mass index is 25.1 kg/m².     General: Alert, cooperative, no acute distress  Thyroid:  no enlargement/tenderness/palpable nodules  Lungs: Clear to auscultation bilaterally, respirations unlabored  Heart: Regular rate and rhythm, S1 and S2 normal, no murmur, rub or gallop  Abdomen: Soft, NT, ND and Bowel sounds Positive  Extremities:  Extremities normal, atraumatic, no cyanosis or edema    ==========================================================================    LAB EVALUATION    Lab Results   Component Value Date    GLUCOSE 82 05/06/2025    BUN 12 05/06/2025    CREATININE 0.92 05/06/2025    EGFRIFAFRI 87 02/15/2022    BCR 13 05/06/2025    K 4.4 05/06/2025    CO2 25 05/06/2025    CALCIUM 9.3 05/06/2025    ALBUMIN 4.3 05/06/2025    AST 21 05/06/2025    ALT 13 05/06/2025    CHOL 203 (H) 02/15/2022    TRIG 109 02/15/2022    HDL 63 (H) 02/15/2022     (H) 02/15/2022     No results found for: \"HGBA1C\"  Lab Results   Component Value Date    CREATININE 0.92 05/06/2025     Lab Results   Component Value Date    TSH 1.180 05/06/2025    FREET4 0.73 (L) 05/06/2025      Latest Reference Range & Units 09/11/23 11:25   Thyroid Stimulating Immunoglobulin 0.00 - 0.55 IU/L 3.13 (H)   (H): Data is abnormally high    ==========================================================================    ASSESSMENT AND PLAN    # Hyperthyroidism due to Graves' disease  # Exophthalmos, patient to follow up with " "ophthalmology ([facility])  # Hyperlipidemia  # Hypertension  # Overweight with BMI of 25.10    - Repeat blood work showed free T4 on the lower end radius TSH within normal limits  - Will try de-escalation of methimazole therapy to 5 mg 5 days a week (Monday to Friday)  - Repeat blood work in 2 months and in 4 months time  - Discussed with patient about possibility of surgical evaluation in the future as well to which implies understanding    Return to clinic: 4 months    Entire assessment and plan was discussed and counseled the patient in detail to which patient verbalized understanding and agreed with care.  Answered all queries and concerns.    This note was created using voice recognition software and is inherently subject to errors including those of syntax and \"sound-alike\" substitutions which may escape proofreading.  In such instances, original meaning may be extrapolated by contextual derivation.    Note: Portions of this note may have been copied from previous notes but documentation have been reviewed and edited as necessary to support clinical decision making for today's visit.    ==========================================================================    INFORMATION PROVIDED TO PATIENT    Patient Instructions   Please,    - Change methimazole to 5 mgs, five days a week from Monday to Friday.  - Plan for repeat blood work in 2 months and in 4 months.    Follow-up in my clinic back again in 4 months.    Thank you for your visit today.    If you have any questions or concerns please feel free to reach out of the office.       ==========================================================================  Julian Mullins MD  Department of Endocrine, Diabetes and Metabolism  [facility] [place]  ==========================================================================  "

## 2025-07-09 ENCOUNTER — LAB (OUTPATIENT)
Dept: ENDOCRINOLOGY | Facility: CLINIC | Age: 65
End: 2025-07-09
Payer: COMMERCIAL

## 2025-07-09 DIAGNOSIS — E05.00 GRAVES' DISEASE: ICD-10-CM

## 2025-07-10 LAB
T4 FREE SERPL-MCNC: 0.91 NG/DL (ref 0.82–1.77)
TSH SERPL DL<=0.005 MIU/L-ACNC: 1.57 UIU/ML (ref 0.45–4.5)

## (undated) DEVICE — PK ENDO GI 50

## (undated) DEVICE — SNAR POLYP HOTSNARE/BRAIDED OVL/MINI 7F 2.8X10MM 230CM 1P/U

## (undated) DEVICE — TRAP WIDEEYE POLYP